# Patient Record
Sex: FEMALE | Race: WHITE | NOT HISPANIC OR LATINO
[De-identification: names, ages, dates, MRNs, and addresses within clinical notes are randomized per-mention and may not be internally consistent; named-entity substitution may affect disease eponyms.]

---

## 2017-03-27 ENCOUNTER — APPOINTMENT (OUTPATIENT)
Dept: OBGYN | Facility: CLINIC | Age: 62
End: 2017-03-27

## 2017-03-27 VITALS
SYSTOLIC BLOOD PRESSURE: 114 MMHG | WEIGHT: 160 LBS | DIASTOLIC BLOOD PRESSURE: 70 MMHG | BODY MASS INDEX: 28.35 KG/M2 | HEIGHT: 63 IN

## 2017-03-27 DIAGNOSIS — N64.4 MASTODYNIA: ICD-10-CM

## 2017-03-28 ENCOUNTER — FORM ENCOUNTER (OUTPATIENT)
Age: 62
End: 2017-03-28

## 2017-03-29 ENCOUNTER — OUTPATIENT (OUTPATIENT)
Dept: OUTPATIENT SERVICES | Facility: HOSPITAL | Age: 62
LOS: 1 days | End: 2017-03-29
Payer: COMMERCIAL

## 2017-03-29 ENCOUNTER — APPOINTMENT (OUTPATIENT)
Dept: MAMMOGRAPHY | Facility: CLINIC | Age: 62
End: 2017-03-29

## 2017-03-29 ENCOUNTER — APPOINTMENT (OUTPATIENT)
Dept: ULTRASOUND IMAGING | Facility: CLINIC | Age: 62
End: 2017-03-29

## 2017-03-29 DIAGNOSIS — Z00.8 ENCOUNTER FOR OTHER GENERAL EXAMINATION: ICD-10-CM

## 2017-03-29 PROCEDURE — 77065 DX MAMMO INCL CAD UNI: CPT

## 2017-03-29 PROCEDURE — 76641 ULTRASOUND BREAST COMPLETE: CPT

## 2017-03-29 PROCEDURE — G0279: CPT

## 2017-03-30 ENCOUNTER — RESULT REVIEW (OUTPATIENT)
Age: 62
End: 2017-03-30

## 2017-04-04 DIAGNOSIS — N60.42 MAMMARY DUCT ECTASIA OF LEFT BREAST: ICD-10-CM

## 2017-04-04 DIAGNOSIS — R92.8 OTHER ABNORMAL AND INCONCLUSIVE FINDINGS ON DIAGNOSTIC IMAGING OF BREAST: ICD-10-CM

## 2017-07-17 ENCOUNTER — APPOINTMENT (OUTPATIENT)
Dept: OBGYN | Facility: CLINIC | Age: 62
End: 2017-07-17

## 2017-07-17 VITALS
SYSTOLIC BLOOD PRESSURE: 122 MMHG | HEIGHT: 63 IN | WEIGHT: 158 LBS | BODY MASS INDEX: 28 KG/M2 | DIASTOLIC BLOOD PRESSURE: 70 MMHG

## 2017-07-17 DIAGNOSIS — N81.10 CYSTOCELE, UNSPECIFIED: ICD-10-CM

## 2017-07-17 DIAGNOSIS — R10.2 PELVIC AND PERINEAL PAIN: ICD-10-CM

## 2017-07-18 LAB
APPEARANCE: CLEAR
BACTERIA: NEGATIVE
BILIRUBIN URINE: NEGATIVE
BLOOD URINE: NEGATIVE
COLOR: YELLOW
GLUCOSE QUALITATIVE U: NORMAL MG/DL
HYALINE CASTS: 2 /LPF
KETONES URINE: NEGATIVE
LEUKOCYTE ESTERASE URINE: NEGATIVE
MICROSCOPIC-UA: NORMAL
NITRITE URINE: NEGATIVE
PH URINE: 5.5
PROTEIN URINE: NEGATIVE MG/DL
RED BLOOD CELLS URINE: 3 /HPF
SPECIFIC GRAVITY URINE: 1.02
SQUAMOUS EPITHELIAL CELLS: 1 /HPF
UROBILINOGEN URINE: NORMAL MG/DL
WHITE BLOOD CELLS URINE: 0 /HPF

## 2017-07-20 ENCOUNTER — CLINICAL ADVICE (OUTPATIENT)
Age: 62
End: 2017-07-20

## 2017-07-20 DIAGNOSIS — R39.9 UNSPECIFIED SYMPTOMS AND SIGNS INVOLVING THE GENITOURINARY SYSTEM: ICD-10-CM

## 2017-07-20 LAB — BACTERIA UR CULT: ABNORMAL

## 2017-07-25 ENCOUNTER — FORM ENCOUNTER (OUTPATIENT)
Age: 62
End: 2017-07-25

## 2017-07-26 ENCOUNTER — OUTPATIENT (OUTPATIENT)
Dept: OUTPATIENT SERVICES | Facility: HOSPITAL | Age: 62
LOS: 1 days | End: 2017-07-26
Payer: COMMERCIAL

## 2017-07-26 ENCOUNTER — APPOINTMENT (OUTPATIENT)
Dept: ULTRASOUND IMAGING | Facility: CLINIC | Age: 62
End: 2017-07-26

## 2017-07-26 DIAGNOSIS — Z00.8 ENCOUNTER FOR OTHER GENERAL EXAMINATION: ICD-10-CM

## 2017-07-26 PROCEDURE — 76856 US EXAM PELVIC COMPLETE: CPT

## 2017-07-26 PROCEDURE — 76830 TRANSVAGINAL US NON-OB: CPT

## 2017-07-28 ENCOUNTER — RESULT REVIEW (OUTPATIENT)
Age: 62
End: 2017-07-28

## 2017-08-09 ENCOUNTER — APPOINTMENT (OUTPATIENT)
Dept: OBGYN | Facility: CLINIC | Age: 62
End: 2017-08-09
Payer: COMMERCIAL

## 2017-08-09 DIAGNOSIS — Z87.440 PERSONAL HISTORY OF URINARY (TRACT) INFECTIONS: ICD-10-CM

## 2017-08-09 DIAGNOSIS — R31.29 OTHER MICROSCOPIC HEMATURIA: ICD-10-CM

## 2017-08-09 PROCEDURE — 99213 OFFICE O/P EST LOW 20 MIN: CPT

## 2017-08-09 PROCEDURE — 81003 URINALYSIS AUTO W/O SCOPE: CPT | Mod: QW

## 2017-08-11 LAB
APPEARANCE: CLEAR
BACTERIA: NEGATIVE
BILIRUBIN URINE: NEGATIVE
BLOOD URINE: NEGATIVE
COLOR: YELLOW
GLUCOSE QUALITATIVE U: NORMAL MG/DL
HYALINE CASTS: 3 /LPF
KETONES URINE: NEGATIVE
LEUKOCYTE ESTERASE URINE: NEGATIVE
MICROSCOPIC-UA: NORMAL
NITRITE URINE: NEGATIVE
PH URINE: 6.5
PROTEIN URINE: NEGATIVE MG/DL
RED BLOOD CELLS URINE: 2 /HPF
SPECIFIC GRAVITY URINE: 1.01
SQUAMOUS EPITHELIAL CELLS: 0 /HPF
UROBILINOGEN URINE: NORMAL MG/DL
WHITE BLOOD CELLS URINE: 0 /HPF

## 2017-08-14 LAB — BACTERIA UR CULT: NORMAL

## 2017-09-25 ENCOUNTER — APPOINTMENT (OUTPATIENT)
Dept: OBGYN | Facility: CLINIC | Age: 62
End: 2017-09-25
Payer: COMMERCIAL

## 2017-09-25 VITALS
HEIGHT: 63 IN | DIASTOLIC BLOOD PRESSURE: 80 MMHG | WEIGHT: 160 LBS | BODY MASS INDEX: 28.35 KG/M2 | SYSTOLIC BLOOD PRESSURE: 115 MMHG

## 2017-09-25 DIAGNOSIS — N95.2 POSTMENOPAUSAL ATROPHIC VAGINITIS: ICD-10-CM

## 2017-09-25 DIAGNOSIS — Z01.419 ENCOUNTER FOR GYNECOLOGICAL EXAMINATION (GENERAL) (ROUTINE) W/OUT ABNORMAL FINDINGS: ICD-10-CM

## 2017-09-25 PROCEDURE — 82270 OCCULT BLOOD FECES: CPT

## 2017-09-25 PROCEDURE — 99396 PREV VISIT EST AGE 40-64: CPT

## 2017-09-25 PROCEDURE — 81003 URINALYSIS AUTO W/O SCOPE: CPT | Mod: QW

## 2017-09-28 LAB
CYTOLOGY CVX/VAG DOC THIN PREP: NORMAL
HPV HIGH+LOW RISK DNA PNL CVX: NEGATIVE

## 2017-10-24 ENCOUNTER — FORM ENCOUNTER (OUTPATIENT)
Age: 62
End: 2017-10-24

## 2017-10-25 ENCOUNTER — APPOINTMENT (OUTPATIENT)
Dept: MAMMOGRAPHY | Facility: CLINIC | Age: 62
End: 2017-10-25
Payer: COMMERCIAL

## 2017-10-25 ENCOUNTER — APPOINTMENT (OUTPATIENT)
Dept: RADIOLOGY | Facility: CLINIC | Age: 62
End: 2017-10-25
Payer: COMMERCIAL

## 2017-10-25 ENCOUNTER — OUTPATIENT (OUTPATIENT)
Dept: OUTPATIENT SERVICES | Facility: HOSPITAL | Age: 62
LOS: 1 days | End: 2017-10-25
Payer: COMMERCIAL

## 2017-10-25 DIAGNOSIS — Z00.8 ENCOUNTER FOR OTHER GENERAL EXAMINATION: ICD-10-CM

## 2017-10-25 PROCEDURE — 77067 SCR MAMMO BI INCL CAD: CPT

## 2017-10-25 PROCEDURE — G0202: CPT | Mod: 26

## 2017-10-25 PROCEDURE — 77063 BREAST TOMOSYNTHESIS BI: CPT | Mod: 26

## 2017-10-25 PROCEDURE — 77080 DXA BONE DENSITY AXIAL: CPT | Mod: 26

## 2017-10-25 PROCEDURE — 77080 DXA BONE DENSITY AXIAL: CPT

## 2017-10-25 PROCEDURE — 77063 BREAST TOMOSYNTHESIS BI: CPT

## 2017-11-02 ENCOUNTER — RESULT REVIEW (OUTPATIENT)
Age: 62
End: 2017-11-02

## 2018-06-07 RX ORDER — CONJUGATED ESTROGENS 0.62 MG/G
0.62 CREAM VAGINAL
Qty: 30 | Refills: 1 | Status: COMPLETED | COMMUNITY
Start: 2017-07-17 | End: 2017-07-17

## 2018-06-07 RX ORDER — CIPROFLOXACIN HYDROCHLORIDE 500 MG/1
500 TABLET, FILM COATED ORAL
Qty: 14 | Refills: 0 | Status: COMPLETED | COMMUNITY
Start: 2017-07-20 | End: 2017-07-20

## 2018-09-26 ENCOUNTER — APPOINTMENT (OUTPATIENT)
Dept: OBGYN | Facility: CLINIC | Age: 63
End: 2018-09-26
Payer: COMMERCIAL

## 2018-09-26 VITALS
HEIGHT: 63 IN | SYSTOLIC BLOOD PRESSURE: 128 MMHG | WEIGHT: 155 LBS | DIASTOLIC BLOOD PRESSURE: 54 MMHG | OXYGEN SATURATION: 98 % | BODY MASS INDEX: 27.46 KG/M2 | TEMPERATURE: 98.7 F

## 2018-09-26 DIAGNOSIS — N95.2 POSTMENOPAUSAL ATROPHIC VAGINITIS: ICD-10-CM

## 2018-09-26 PROCEDURE — 82270 OCCULT BLOOD FECES: CPT

## 2018-09-26 PROCEDURE — 99396 PREV VISIT EST AGE 40-64: CPT

## 2018-10-19 LAB
CYTOLOGY CVX/VAG DOC THIN PREP: NORMAL
HPV HIGH+LOW RISK DNA PNL CVX: NOT DETECTED

## 2018-10-25 ENCOUNTER — FORM ENCOUNTER (OUTPATIENT)
Age: 63
End: 2018-10-25

## 2018-10-26 ENCOUNTER — OUTPATIENT (OUTPATIENT)
Dept: OUTPATIENT SERVICES | Facility: HOSPITAL | Age: 63
LOS: 1 days | End: 2018-10-26
Payer: COMMERCIAL

## 2018-10-26 ENCOUNTER — APPOINTMENT (OUTPATIENT)
Dept: MAMMOGRAPHY | Facility: CLINIC | Age: 63
End: 2018-10-26
Payer: COMMERCIAL

## 2018-10-26 DIAGNOSIS — Z00.8 ENCOUNTER FOR OTHER GENERAL EXAMINATION: ICD-10-CM

## 2018-10-26 PROCEDURE — 77067 SCR MAMMO BI INCL CAD: CPT | Mod: 26

## 2018-10-26 PROCEDURE — 77067 SCR MAMMO BI INCL CAD: CPT

## 2018-10-26 PROCEDURE — 77063 BREAST TOMOSYNTHESIS BI: CPT | Mod: 26

## 2018-10-26 PROCEDURE — 77063 BREAST TOMOSYNTHESIS BI: CPT

## 2019-10-23 ENCOUNTER — APPOINTMENT (OUTPATIENT)
Dept: OBGYN | Facility: CLINIC | Age: 64
End: 2019-10-23
Payer: COMMERCIAL

## 2019-10-23 VITALS
BODY MASS INDEX: 28.7 KG/M2 | WEIGHT: 162 LBS | HEIGHT: 63 IN | SYSTOLIC BLOOD PRESSURE: 100 MMHG | DIASTOLIC BLOOD PRESSURE: 70 MMHG

## 2019-10-23 DIAGNOSIS — Z01.419 ENCOUNTER FOR GYNECOLOGICAL EXAMINATION (GENERAL) (ROUTINE) W/OUT ABNORMAL FINDINGS: ICD-10-CM

## 2019-10-23 DIAGNOSIS — N95.2 POSTMENOPAUSAL ATROPHIC VAGINITIS: ICD-10-CM

## 2019-10-23 DIAGNOSIS — Z80.3 FAMILY HISTORY OF MALIGNANT NEOPLASM OF BREAST: ICD-10-CM

## 2019-10-23 PROCEDURE — 82270 OCCULT BLOOD FECES: CPT

## 2019-10-23 PROCEDURE — 99396 PREV VISIT EST AGE 40-64: CPT

## 2019-10-23 NOTE — CHIEF COMPLAINT
[Annual Visit] : annual visit [FreeTextEntry1] : Patient is a 64-year-old female presents for routine annual gynecologic examination. Patient has a significant family history of breast cancer. Patient's last mammogram was one year ago last bone density 2 years ago. Patient uses Premarin cream. Patient also states that her drop bladder is causing occasional discomfort. Discussed decision in detail the patient.

## 2019-10-23 NOTE — PHYSICAL EXAM
[Awake] : awake [Alert] : alert [Acute Distress] : no acute distress [Mass] : no breast mass [Nipple Discharge] : no nipple discharge [Axillary LAD] : no axillary lymphadenopathy [Soft] : soft [Tender] : non tender [Oriented x3] : oriented to person, place, and time [Vulvar Atrophy] : vulvar atrophy [Normal] : uterus [Atrophy] : atrophy [Cystocele] : a cystocele [No Bleeding] : there was no active vaginal bleeding [Pap Obtained] : a Pap smear was performed [Uterine Adnexae] : were not tender and not enlarged [No Tenderness] : no rectal tenderness [Occult Blood] : occult blood test from digital rectal exam was negative [Nl Sphincter Tone] : normal sphincter tone [FreeTextEntry4] : mild cystocele

## 2019-10-25 LAB — HPV HIGH+LOW RISK DNA PNL CVX: NOT DETECTED

## 2019-10-29 ENCOUNTER — FORM ENCOUNTER (OUTPATIENT)
Age: 64
End: 2019-10-29

## 2019-10-30 ENCOUNTER — OUTPATIENT (OUTPATIENT)
Dept: OUTPATIENT SERVICES | Facility: HOSPITAL | Age: 64
LOS: 1 days | End: 2019-10-30
Payer: COMMERCIAL

## 2019-10-30 ENCOUNTER — APPOINTMENT (OUTPATIENT)
Dept: RADIOLOGY | Facility: CLINIC | Age: 64
End: 2019-10-30
Payer: COMMERCIAL

## 2019-10-30 ENCOUNTER — APPOINTMENT (OUTPATIENT)
Dept: MAMMOGRAPHY | Facility: CLINIC | Age: 64
End: 2019-10-30
Payer: COMMERCIAL

## 2019-10-30 DIAGNOSIS — Z00.8 ENCOUNTER FOR OTHER GENERAL EXAMINATION: ICD-10-CM

## 2019-10-30 PROCEDURE — 77067 SCR MAMMO BI INCL CAD: CPT | Mod: 26

## 2019-10-30 PROCEDURE — 77063 BREAST TOMOSYNTHESIS BI: CPT | Mod: 26

## 2019-10-30 PROCEDURE — 77080 DXA BONE DENSITY AXIAL: CPT | Mod: 26

## 2019-10-30 PROCEDURE — 77063 BREAST TOMOSYNTHESIS BI: CPT

## 2019-10-30 PROCEDURE — 77067 SCR MAMMO BI INCL CAD: CPT

## 2019-10-30 PROCEDURE — 77080 DXA BONE DENSITY AXIAL: CPT

## 2019-11-15 ENCOUNTER — RESULT REVIEW (OUTPATIENT)
Age: 64
End: 2019-11-15

## 2020-05-27 ENCOUNTER — APPOINTMENT (OUTPATIENT)
Dept: OBGYN | Facility: CLINIC | Age: 65
End: 2020-05-27
Payer: COMMERCIAL

## 2020-05-27 VITALS — HEIGHT: 63 IN | DIASTOLIC BLOOD PRESSURE: 62 MMHG | SYSTOLIC BLOOD PRESSURE: 110 MMHG

## 2020-05-27 DIAGNOSIS — N60.12 DIFFUSE CYSTIC MASTOPATHY OF RIGHT BREAST: ICD-10-CM

## 2020-05-27 DIAGNOSIS — Z80.3 FAMILY HISTORY OF MALIGNANT NEOPLASM OF BREAST: ICD-10-CM

## 2020-05-27 DIAGNOSIS — N60.11 DIFFUSE CYSTIC MASTOPATHY OF RIGHT BREAST: ICD-10-CM

## 2020-05-27 PROCEDURE — 99213 OFFICE O/P EST LOW 20 MIN: CPT

## 2020-05-27 NOTE — CHIEF COMPLAINT
[Follow Up] : follow up GYN visit [FreeTextEntry1] : Patient is a 65-year-old female presents with a six-month interval breast exam surveillance. Patient with a significant family history of breast cancer and fibrocystic breast changes. Patient has no complaints. Patient's last mammogram was October 2019

## 2020-09-16 ENCOUNTER — TRANSCRIPTION ENCOUNTER (OUTPATIENT)
Age: 65
End: 2020-09-16

## 2020-10-31 ENCOUNTER — OUTPATIENT (OUTPATIENT)
Dept: OUTPATIENT SERVICES | Facility: HOSPITAL | Age: 65
LOS: 1 days | End: 2020-10-31
Payer: COMMERCIAL

## 2020-10-31 ENCOUNTER — RESULT REVIEW (OUTPATIENT)
Age: 65
End: 2020-10-31

## 2020-10-31 ENCOUNTER — APPOINTMENT (OUTPATIENT)
Dept: MAMMOGRAPHY | Facility: CLINIC | Age: 65
End: 2020-10-31
Payer: COMMERCIAL

## 2020-10-31 DIAGNOSIS — Z00.8 ENCOUNTER FOR OTHER GENERAL EXAMINATION: ICD-10-CM

## 2020-10-31 DIAGNOSIS — Z12.39 ENCOUNTER FOR OTHER SCREENING FOR MALIGNANT NEOPLASM OF BREAST: ICD-10-CM

## 2020-10-31 PROCEDURE — 77067 SCR MAMMO BI INCL CAD: CPT

## 2020-10-31 PROCEDURE — 77063 BREAST TOMOSYNTHESIS BI: CPT

## 2020-10-31 PROCEDURE — 77063 BREAST TOMOSYNTHESIS BI: CPT | Mod: 26

## 2020-10-31 PROCEDURE — 77067 SCR MAMMO BI INCL CAD: CPT | Mod: 26

## 2020-12-01 ENCOUNTER — APPOINTMENT (OUTPATIENT)
Dept: OBGYN | Facility: CLINIC | Age: 65
End: 2020-12-01
Payer: COMMERCIAL

## 2020-12-01 VITALS
RESPIRATION RATE: 16 BRPM | WEIGHT: 15 LBS | BODY MASS INDEX: 2.66 KG/M2 | DIASTOLIC BLOOD PRESSURE: 68 MMHG | HEIGHT: 63 IN | SYSTOLIC BLOOD PRESSURE: 106 MMHG

## 2020-12-01 DIAGNOSIS — Z01.419 ENCOUNTER FOR GYNECOLOGICAL EXAMINATION (GENERAL) (ROUTINE) W/OUT ABNORMAL FINDINGS: ICD-10-CM

## 2020-12-01 DIAGNOSIS — N95.2 POSTMENOPAUSAL ATROPHIC VAGINITIS: ICD-10-CM

## 2020-12-01 DIAGNOSIS — Z80.3 FAMILY HISTORY OF MALIGNANT NEOPLASM OF BREAST: ICD-10-CM

## 2020-12-01 PROCEDURE — 99396 PREV VISIT EST AGE 40-64: CPT

## 2020-12-01 PROCEDURE — 99072 ADDL SUPL MATRL&STAF TM PHE: CPT

## 2020-12-01 NOTE — HISTORY OF PRESENT ILLNESS
[FreeTextEntry1] : Patient is a 65-year-old female presents for routine gynecologic examination. Patient has no complaints. Patient's family history is significant for mother who developed breast cancer in her 80s. Patient uses Premarin cream. Patient's last mammogram was October 2020 last bone density was one year ago.

## 2020-12-02 LAB
CYTOLOGY CVX/VAG DOC THIN PREP: NORMAL
HPV HIGH+LOW RISK DNA PNL CVX: NOT DETECTED

## 2020-12-07 ENCOUNTER — APPOINTMENT (OUTPATIENT)
Dept: OBGYN | Facility: CLINIC | Age: 65
End: 2020-12-07

## 2020-12-15 PROBLEM — Z87.440 HISTORY OF URINARY TRACT INFECTION: Status: RESOLVED | Noted: 2017-07-20 | Resolved: 2020-12-15

## 2021-03-19 ENCOUNTER — NON-APPOINTMENT (OUTPATIENT)
Age: 66
End: 2021-03-19

## 2021-03-29 ENCOUNTER — NON-APPOINTMENT (OUTPATIENT)
Age: 66
End: 2021-03-29

## 2021-03-31 ENCOUNTER — NON-APPOINTMENT (OUTPATIENT)
Age: 66
End: 2021-03-31

## 2021-03-31 RX ORDER — CONJUGATED ESTROGENS 0.62 MG/G
0.62 CREAM VAGINAL
Qty: 1 | Refills: 3 | Status: DISCONTINUED | COMMUNITY
Start: 2019-10-23 | End: 2021-03-31

## 2021-03-31 RX ORDER — CONJUGATED ESTROGENS 0.62 MG/G
0.62 CREAM VAGINAL
Qty: 1 | Refills: 2 | Status: DISCONTINUED | COMMUNITY
Start: 2020-12-01 | End: 2021-03-31

## 2021-03-31 RX ORDER — CONJUGATED ESTROGENS 0.62 MG/G
0.62 CREAM VAGINAL
Qty: 1 | Refills: 3 | Status: DISCONTINUED | COMMUNITY
Start: 2018-09-26 | End: 2021-03-31

## 2021-04-06 ENCOUNTER — NON-APPOINTMENT (OUTPATIENT)
Age: 66
End: 2021-04-06

## 2021-10-25 DIAGNOSIS — Z12.39 ENCOUNTER FOR OTHER SCREENING FOR MALIGNANT NEOPLASM OF BREAST: ICD-10-CM

## 2021-11-16 ENCOUNTER — OUTPATIENT (OUTPATIENT)
Dept: OUTPATIENT SERVICES | Facility: HOSPITAL | Age: 66
LOS: 1 days | End: 2021-11-16
Payer: COMMERCIAL

## 2021-11-16 ENCOUNTER — RESULT REVIEW (OUTPATIENT)
Age: 66
End: 2021-11-16

## 2021-11-16 ENCOUNTER — APPOINTMENT (OUTPATIENT)
Dept: RADIOLOGY | Facility: CLINIC | Age: 66
End: 2021-11-16
Payer: COMMERCIAL

## 2021-11-16 ENCOUNTER — APPOINTMENT (OUTPATIENT)
Dept: MAMMOGRAPHY | Facility: CLINIC | Age: 66
End: 2021-11-16
Payer: COMMERCIAL

## 2021-11-16 DIAGNOSIS — Z12.39 ENCOUNTER FOR OTHER SCREENING FOR MALIGNANT NEOPLASM OF BREAST: ICD-10-CM

## 2021-11-16 PROCEDURE — 77063 BREAST TOMOSYNTHESIS BI: CPT | Mod: 26

## 2021-11-16 PROCEDURE — 77067 SCR MAMMO BI INCL CAD: CPT

## 2021-11-16 PROCEDURE — 77085 DXA BONE DENSITY AXL VRT FX: CPT

## 2021-11-16 PROCEDURE — 77085 DXA BONE DENSITY AXL VRT FX: CPT | Mod: 26

## 2021-11-16 PROCEDURE — 77063 BREAST TOMOSYNTHESIS BI: CPT

## 2021-11-16 PROCEDURE — 77067 SCR MAMMO BI INCL CAD: CPT | Mod: 26

## 2021-11-19 ENCOUNTER — NON-APPOINTMENT (OUTPATIENT)
Age: 66
End: 2021-11-19

## 2021-12-15 ENCOUNTER — APPOINTMENT (OUTPATIENT)
Dept: OBGYN | Facility: CLINIC | Age: 66
End: 2021-12-15
Payer: COMMERCIAL

## 2021-12-15 VITALS
HEIGHT: 63 IN | BODY MASS INDEX: 27.46 KG/M2 | SYSTOLIC BLOOD PRESSURE: 126 MMHG | WEIGHT: 155 LBS | DIASTOLIC BLOOD PRESSURE: 80 MMHG

## 2021-12-15 DIAGNOSIS — Z80.3 FAMILY HISTORY OF MALIGNANT NEOPLASM OF BREAST: ICD-10-CM

## 2021-12-15 DIAGNOSIS — Z01.419 ENCOUNTER FOR GYNECOLOGICAL EXAMINATION (GENERAL) (ROUTINE) W/OUT ABNORMAL FINDINGS: ICD-10-CM

## 2021-12-15 DIAGNOSIS — Z87.39 PERSONAL HISTORY OF OTHER DISEASES OF THE MUSCULOSKELETAL SYSTEM AND CONNECTIVE TISSUE: ICD-10-CM

## 2021-12-15 DIAGNOSIS — N81.11 CYSTOCELE, MIDLINE: ICD-10-CM

## 2021-12-15 DIAGNOSIS — N95.2 POSTMENOPAUSAL ATROPHIC VAGINITIS: ICD-10-CM

## 2021-12-15 PROCEDURE — 82270 OCCULT BLOOD FECES: CPT

## 2021-12-15 PROCEDURE — 99396 PREV VISIT EST AGE 40-64: CPT

## 2021-12-15 NOTE — HISTORY OF PRESENT ILLNESS
[FreeTextEntry1] : Patient is a 66-year-old female presents for routine annual gynecologic examination. Patient has a significant family history mother with breast cancer diagnosed postmenopausal. Patient states that she does have a slightly symptomatic dropped bladder. No other symptoms other than occasional pressure. Discuss this issue the patient in terms of treatment options patient states that she understands and will decide  patient's last mammogram in bone density n 2021

## 2021-12-15 NOTE — PHYSICAL EXAM
[Chaperone Present] : A chaperone was present in the examining room during all aspects of the physical examination [Appropriately responsive] : appropriately responsive [Alert] : alert [No Acute Distress] : no acute distress [No Lymphadenopathy] : no lymphadenopathy [Regular Rate Rhythm] : regular rate rhythm [No Murmurs] : no murmurs [Clear to Auscultation B/L] : clear to auscultation bilaterally [Soft] : soft [Non-tender] : non-tender [Non-distended] : non-distended [No HSM] : No HSM [No Lesions] : no lesions [No Mass] : no mass [Oriented x3] : oriented x3 [Examination Of The Breasts] : a normal appearance [No Masses] : no breast masses were palpable [Vulvar Atrophy] : vulvar atrophy [Labia Majora] : normal [Labia Minora] : normal [Atrophy] : atrophy [Cystocele] : a cystocele [Normal] : normal [Uterine Adnexae] : normal [No Tenderness] : no tenderness [Nl Sphincter Tone] : normal sphincter tone [FreeTextEntry4] : first to second degree cystocele [FreeTextEntry5] : Pap done [FreeTextEntry9] : hemoccult negative

## 2021-12-17 LAB — HPV HIGH+LOW RISK DNA PNL CVX: NOT DETECTED

## 2022-01-03 ENCOUNTER — NON-APPOINTMENT (OUTPATIENT)
Age: 67
End: 2022-01-03

## 2022-09-28 RX ORDER — ESTRADIOL 0.1 MG/G
0.1 CREAM VAGINAL
Qty: 1 | Refills: 0 | Status: ACTIVE | COMMUNITY
Start: 2021-03-31 | End: 1900-01-01

## 2022-09-28 RX ORDER — CONJUGATED ESTROGENS 0.62 MG/G
0.62 CREAM VAGINAL
Qty: 1 | Refills: 0 | Status: ACTIVE | COMMUNITY
Start: 2022-09-28 | End: 1900-01-01

## 2022-11-07 ENCOUNTER — APPOINTMENT (OUTPATIENT)
Dept: ORTHOPEDIC SURGERY | Facility: CLINIC | Age: 67
End: 2022-11-07

## 2022-11-07 VITALS — WEIGHT: 153 LBS | HEIGHT: 63 IN | BODY MASS INDEX: 27.11 KG/M2

## 2022-11-07 PROCEDURE — 99204 OFFICE O/P NEW MOD 45 MIN: CPT

## 2022-11-07 PROCEDURE — 72040 X-RAY EXAM NECK SPINE 2-3 VW: CPT

## 2022-11-08 ENCOUNTER — FORM ENCOUNTER (OUTPATIENT)
Age: 67
End: 2022-11-08

## 2022-11-09 ENCOUNTER — APPOINTMENT (OUTPATIENT)
Dept: MRI IMAGING | Facility: CLINIC | Age: 67
End: 2022-11-09

## 2022-11-09 PROCEDURE — 72141 MRI NECK SPINE W/O DYE: CPT | Mod: MH

## 2022-11-09 NOTE — HISTORY OF PRESENT ILLNESS
[de-identified] : The patient is a 67 year old right hand dominant female who presents today complaining of right shoulder pain.  \par Date of Injury/Onset: 9/7/22\par Pain:    At Rest: 2/10 \par With Activity:  7/10 \par Mechanism of injury: In [employer] and a [employer] box fell on patient and has had right shoulder pain since\par This is NOT a Work Related Injury being treated under Worker's Compensation.\par This is NOt an athletic injury occurring associated with an interscholastic or organized sports team.\par Quality of symptoms: aches, radiates down to elbow, sore\par Improves with: NSAIDs, rest\par Worse with: continuos circular motions, overuse\par Prior treatment: Called GP day it happened and he ordered an Xray and CT of her head\par Prior Imaging: XRay at [facility]\par Out of work/sport: still working\par 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 Penny\par \par

## 2022-11-09 NOTE — IMAGING
[Right] : right shoulder [Glenohumeral arthritis] : Glenohumeral arthritis [de-identified] : The patient is a well appearing 67 year  old female of their stated age. \par Neck is supple & nontender to palpation. + Spurling's test on the right. \par \par Effected Shoulder: RIGHT \par Inspection: \par Scapula Winging: Negative \par Deformity: None \par Erythema: None \par Ecchymosis: None \par Abrasions: None \par Effusion: None \par \par Range of Motion: \par Active Forward Flexion: 180 degrees  \par Active Abduction: 180 degrees  \par Passive Forward Flexion: 180 degrees  \par Passive Abduction: 180 degrees  \par ER @ 90 degrees: 90 degrees \par IR @ 90 degrees: 45 degrees \par ER @ 0 degrees: 50 degrees \par Motor Exam: \par Forward Flexion: 5 out of 5 \par Flexion Plane of Scapula: 5 out of 5 \par Abduction: 5 out of 5 \par Internal Rotation: 5 out of 5 \par External Rotation: 5 out of 5 \par Distal Motor Strength: 5 out of 5 \par \par Stability Testing: \par Anterior: 1+ \par Posterior: 1+ \par Sulcus N: 1+ \par Sulcus ER: 1+ \par Provocative Tests: \par Drop Arm: Negative \par Impingement: +\par [place]: Negative \par X-Arm Adduction: Negative \par Belly Press: Negative \par Bear Hug: Negative \par Lift Off: Negative \par Apprehension: Negative \par Relocation: Negative \par Posterior Load & Shift: Negative \par \par Palpation: \par AC Joint: Nontender \par Clavicle: Nontender \par SC Joint: Nontender \par Bicepital Groove: Nontender \par Coracoid Process: Nontender \par Pectoralis Minor Tendon: Nontender \par Pectoralis Major Tendon: Nontender & palpably intact \par Latissimus Dorsi: Nontender  \par Proximal Humerus: Nontender \par Scapula Body: Nontender \par Medial Scapula Boarder: Nontender \par Scapula Spine: Nontender \par \par Neurologic Exam: Sensation to Light Touch: \par Axillary: Grossly intact \par Ulnar: Grossly intact \par Radial: Grossly intact \par Median: Grossly intact \par Other:  N/A \par Circulatory/Pulses: \par Ulnar: 2+ \par Radial: 2+ \par Other Pertinent Findings: None \par \par Contralateral Shoulder \par Range of Motion: \par Active Forward Flexion: 180 degrees  \par Active Abduction: 180 degrees  \par Passive Forward Flexion: 180 degrees  \par Passive Abduction: 180 degrees  \par ER @ 90 degrees: 90 degrees \par IR @ 90 degrees: 45 degrees \par ER @ 0 degrees: 50 degrees \par Motor Exam: \par Forward Flexion: 5 out of 5 \par Flexion Plane of Scapula: 5 out of 5 \par Abduction: 5 out of 5 \par Internal Rotation: 5 out of 5 \par External Rotation: 5 out of 5 \par Distal Motor Strength: 5 out of 5 \par Stability Testing: \par Anterior: 1+ \par Posterior: 1+ \par Sulcus N: 1+ \par Sulcus ER: 1+ \par Other Pertinent Findings: None \par \par Assessment: The patient is a 67 year old female with right shoulder pain and radiographic and physical exam findings consistent with cervical radiculopathy   \par The patient’s condition is acute \par Documents/Results Reviewed Today: Outside X Ray right shoulder and c-spine \par Tests/Studies Independently Interpreted Today: 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 Modify activity as discussed. \par Tests Ordered: MRI c-spine \par Prescription Medications Ordered: None\par Braces/DME Ordered: None \par Activity/Work/Sports Status: None \par Additional Instructions: None\par Follow-Up: after MRI\par \par The patient's current medication management of their orthopedic diagnosis was reviewed today:\par (1) We discussed a comprehensive treatment plan that included possible pharmaceutical management involving the use of prescription strength medications including but not limited to options such as oral Naprosyn 500mg BID, once daily Meloxicam 15 mg, or 500-650 mg Tylenol versus over the counter oral medications and topical prescription NSAID Pennsaid vs over the counter Voltaren gel.\par (2) There is a moderate risk of morbidity with further treatment, especially from use of prescription strength medications and possible side effects of these medications which include upset stomach with oral medications, skin reactions to topical medications and cardiac/renal issues with long term use.\par (3) I recommended that the patient follow-up with their medical physician to discuss any significant specific potential issues with long term medication use such as interactions with current medications or with exacerbation of underlying medical comorbidities.\par (4) The benefits and risks associated with use of injectable, oral or topical, prescription and over the counter anti-inflammatory medications were discussed with the patient. The patient voiced understanding of the risks including but not limited to bleeding, stroke, kidney dysfunction, heart disease, and were referred to the black box warning label for further information.\par \par I, Kimberlee Rodriguez, attest that this documentation has been prepared under the direction and in the presence of Provider Dr. 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

## 2022-11-14 ENCOUNTER — APPOINTMENT (OUTPATIENT)
Dept: ORTHOPEDIC SURGERY | Facility: CLINIC | Age: 67
End: 2022-11-14

## 2022-11-14 VITALS — HEIGHT: 63 IN | BODY MASS INDEX: 27.11 KG/M2 | WEIGHT: 153 LBS

## 2022-11-14 PROCEDURE — 99214 OFFICE O/P EST MOD 30 MIN: CPT

## 2022-11-14 RX ORDER — METHYLPREDNISOLONE 4 MG/1
4 TABLET ORAL
Qty: 1 | Refills: 0 | Status: ACTIVE | COMMUNITY
Start: 2022-11-14 | End: 1900-01-01

## 2022-11-14 NOTE — HISTORY OF PRESENT ILLNESS
[de-identified] : The patient is a 67 year old right hand dominant female who presents today complaining of right shoulder pain.  \par Date of Injury/Onset: 9/7/22\par Pain:  At Rest: 2/10 \par With Activity:  7/10 \par Mechanism of injury: In [employer] and a box fell on patient and has had right shoulder pain since\par This is NOT a Work Related Injury being treated under Worker's Compensation.\par This is NOt an athletic injury occurring associated with an interscholastic or organized sports team.\par Quality of symptoms: aches, radiates down to elbow, sore\par Improves with: NSAIDs, rest\par Worse with: continuous circular motions, overuse\par Prior treatment: Called GP when it happened and he ordered an Xray and CT of her head\par Prior Imaging: MRI cervical spine [facility]\par Out of work/sport: Still working\par 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 Penny\par \par

## 2022-11-14 NOTE — IMAGING
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 \par Neck is supple & nontender to palpation. + Spurling's test on the right. \par \par Effected Shoulder: RIGHT \par Inspection: \par Scapula Winging: Negative \par Deformity: None \par Erythema: None \par Ecchymosis: None \par Abrasions: None \par Effusion: None \par \par Range of Motion: \par Active Forward Flexion: 180 degrees  \par Active Abduction: 180 degrees  \par Passive Forward Flexion: 180 degrees  \par Passive Abduction: 180 degrees  \par ER @ 90 degrees: 90 degrees \par IR @ 90 degrees: 45 degrees \par ER @ 0 degrees: 50 degrees \par Motor Exam: \par Forward Flexion: 5 out of 5 \par Flexion Plane of Scapula: 5 out of 5 \par Abduction: 5 out of 5 \par Internal Rotation: 5 out of 5 \par External Rotation: 5 out of 5 \par Distal Motor Strength: 5 out of 5 \par \par Stability Testing: \par Anterior: 1+ \par Posterior: 1+ \par Sulcus N: 1+ \par Sulcus ER: 1+ \par Provocative Tests: \par Drop Arm: Negative \par Impingement: +\par [place]: Negative \par X-Arm Adduction: Negative \par Belly Press: Negative \par Bear Hug: Negative \par Lift Off: Negative \par Apprehension: Negative \par Relocation: Negative \par Posterior Load & Shift: Negative \par \par Palpation: \par AC Joint: Nontender \par Clavicle: Nontender \par SC Joint: Nontender \par Bicepital Groove: Nontender \par Coracoid Process: Nontender \par Pectoralis Minor Tendon: Nontender \par Pectoralis Major Tendon: Nontender & palpably intact \par Latissimus Dorsi: Nontender  \par Proximal Humerus: Nontender \par Scapula Body: Nontender \par Medial Scapula Boarder: Nontender \par Scapula Spine: Nontender \par \par Neurologic Exam: Sensation to Light Touch: \par Axillary: Grossly intact \par Ulnar: Grossly intact \par Radial: Grossly intact \par Median: Grossly intact \par Other:  N/A \par Circulatory/Pulses: \par Ulnar: 2+ \par Radial: 2+ \par Other Pertinent Findings: None \par \par Contralateral Shoulder \par Range of Motion: \par Active Forward Flexion: 180 degrees  \par Active Abduction: 180 degrees  \par Passive Forward Flexion: 180 degrees  \par Passive Abduction: 180 degrees  \par ER @ 90 degrees: 90 degrees \par IR @ 90 degrees: 45 degrees \par ER @ 0 degrees: 50 degrees \par Motor Exam: \par Forward Flexion: 5 out of 5 \par Flexion Plane of Scapula: 5 out of 5 \par Abduction: 5 out of 5 \par Internal Rotation: 5 out of 5 \par External Rotation: 5 out of 5 \par Distal Motor Strength: 5 out of 5 \par Stability Testing: \par Anterior: 1+ \par Posterior: 1+ \par Sulcus N: 1+ \par Sulcus ER: 1+ \par Other Pertinent Findings: None \par \par Assessment: The patient is a 67 year old female with right shoulder pain and radiographic and physical exam findings consistent with cervical radiculopathy   \par The patient’s condition is acute \par Documents/Results Reviewed Today: MRI cervical spine\par Tests/Studies Independently Interpreted Today: MRI cervical spine reveals multilevel disc degeneration C4-C5, C5-C6, and C6-C7 with right foraminal disc herniation C5-C6\par Pertinent findings include: +Spurling right, +impingement \par Confounding medical conditions/concerns: None\par \par Plan: Patient will start physical therapy, HEP, and stretching. Prescribed Medrol Dose Pack for pain management - use as directed. Referred patient to pain management for further evaluation and possible SHAE. Modify activity as discussed.  \par Tests Ordered: None \par Prescription Medications Ordered: None\par Braces/DME Ordered: None \par Activity/Work/Sports Status: None \par Additional Instructions: None\par Follow-Up: PRN \par \par The patient's current medication management of their orthopedic diagnosis was reviewed today:\par (1) We discussed a comprehensive treatment plan that included possible pharmaceutical management involving the use of prescription strength medications including but not limited to options such as oral Naprosyn 500mg BID, once daily Meloxicam 15 mg, or 500-650 mg Tylenol versus over the counter oral medications and topical prescription NSAID Pennsaid vs over the counter Voltaren gel.\par (2) There is a moderate risk of morbidity with further treatment, especially from use of prescription strength medications and possible side effects of these medications which include upset stomach with oral medications, skin reactions to topical medications and cardiac/renal issues with long term use.\par (3) I recommended that the patient follow-up with their medical physician to discuss any significant specific potential issues with long term medication use such as interactions with current medications or with exacerbation of underlying medical comorbidities.\par (4) The benefits and risks associated with use of injectable, oral or topical, prescription and over the counter anti-inflammatory medications were discussed with the patient. The patient voiced understanding of the risks including but not limited to bleeding, stroke, kidney dysfunction, heart disease, and were referred to the black box warning label for further information.\par \par I, Kimberlee Rodriguez, attest that this documentation has been prepared under the direction and in the presence of Provider Dr. Odell Rayo.\par \par The documentation recorded by the scribe accurately reflects the service I personally performed and the decisions made by me.\par

## 2022-11-14 NOTE — DATA REVIEWED
[MRI] : MRI [Cervical Spine] : cervical spine [Report was reviewed and noted in the chart] : The report was reviewed and noted in the chart [I independently reviewed and interpreted images and report] : I independently reviewed and interpreted images and report [I reviewed the films/CD and additionally noted] : I reviewed the films/CD and additionally noted [FreeTextEntry1] :  multilevel disc degeneration C4-C5, C5-C6, and C6-C7 with right foraminal disc herniation C5-C6

## 2022-11-18 ENCOUNTER — OUTPATIENT (OUTPATIENT)
Dept: OUTPATIENT SERVICES | Facility: HOSPITAL | Age: 67
LOS: 1 days | End: 2022-11-18
Payer: MEDICARE

## 2022-11-18 ENCOUNTER — APPOINTMENT (OUTPATIENT)
Dept: MAMMOGRAPHY | Facility: CLINIC | Age: 67
End: 2022-11-18

## 2022-11-18 ENCOUNTER — RESULT REVIEW (OUTPATIENT)
Age: 67
End: 2022-11-18

## 2022-11-18 DIAGNOSIS — N63.0 UNSPECIFIED LUMP IN UNSPECIFIED BREAST: ICD-10-CM

## 2022-11-18 DIAGNOSIS — Z12.39 ENCOUNTER FOR OTHER SCREENING FOR MALIGNANT NEOPLASM OF BREAST: ICD-10-CM

## 2022-11-18 PROCEDURE — 77067 SCR MAMMO BI INCL CAD: CPT | Mod: 26

## 2022-11-18 PROCEDURE — 77063 BREAST TOMOSYNTHESIS BI: CPT | Mod: 26

## 2022-11-18 PROCEDURE — 77063 BREAST TOMOSYNTHESIS BI: CPT

## 2022-11-18 PROCEDURE — 77067 SCR MAMMO BI INCL CAD: CPT

## 2022-11-28 ENCOUNTER — NON-APPOINTMENT (OUTPATIENT)
Age: 67
End: 2022-11-28

## 2022-11-29 ENCOUNTER — APPOINTMENT (OUTPATIENT)
Dept: ULTRASOUND IMAGING | Facility: CLINIC | Age: 67
End: 2022-11-29

## 2022-11-29 ENCOUNTER — RESULT REVIEW (OUTPATIENT)
Age: 67
End: 2022-11-29

## 2022-11-29 ENCOUNTER — OUTPATIENT (OUTPATIENT)
Dept: OUTPATIENT SERVICES | Facility: HOSPITAL | Age: 67
LOS: 1 days | End: 2022-11-29
Payer: MEDICARE

## 2022-11-29 DIAGNOSIS — N63.0 UNSPECIFIED LUMP IN UNSPECIFIED BREAST: ICD-10-CM

## 2022-11-29 PROCEDURE — 76642 ULTRASOUND BREAST LIMITED: CPT | Mod: 26,LT

## 2022-11-29 PROCEDURE — 76642 ULTRASOUND BREAST LIMITED: CPT

## 2022-12-01 ENCOUNTER — NON-APPOINTMENT (OUTPATIENT)
Age: 67
End: 2022-12-01

## 2022-12-16 ENCOUNTER — APPOINTMENT (OUTPATIENT)
Dept: OBGYN | Facility: CLINIC | Age: 67
End: 2022-12-16

## 2022-12-16 VITALS
HEIGHT: 63 IN | BODY MASS INDEX: 27.64 KG/M2 | DIASTOLIC BLOOD PRESSURE: 80 MMHG | WEIGHT: 156 LBS | SYSTOLIC BLOOD PRESSURE: 132 MMHG

## 2022-12-16 DIAGNOSIS — Z87.39 PERSONAL HISTORY OF OTHER DISEASES OF THE MUSCULOSKELETAL SYSTEM AND CONNECTIVE TISSUE: ICD-10-CM

## 2022-12-16 DIAGNOSIS — N95.2 POSTMENOPAUSAL ATROPHIC VAGINITIS: ICD-10-CM

## 2022-12-16 DIAGNOSIS — M85.80 OTHER SPECIFIED DISORDERS OF BONE DENSITY AND STRUCTURE, UNSPECIFIED SITE: ICD-10-CM

## 2022-12-16 PROCEDURE — G0101: CPT

## 2022-12-16 PROCEDURE — 82270 OCCULT BLOOD FECES: CPT

## 2022-12-16 RX ORDER — ESTRADIOL 0.1 MG/G
0.1 CREAM VAGINAL
Qty: 1 | Refills: 3 | Status: ACTIVE | COMMUNITY
Start: 2022-12-16 | End: 1900-01-01

## 2022-12-16 NOTE — HISTORY OF PRESENT ILLNESS
[FreeTextEntry1] : Patient is a 67-year-old female who presents for a gynecologic evaluation.  Patient has no complaints.  Patient uses vaginal estradiol cream.  Patient's last mammogram was November 2022 and last bone density was November 2021.

## 2022-12-20 LAB — CYTOLOGY CVX/VAG DOC THIN PREP: NORMAL

## 2023-03-06 ENCOUNTER — APPOINTMENT (OUTPATIENT)
Dept: ORTHOPEDIC SURGERY | Facility: CLINIC | Age: 68
End: 2023-03-06
Payer: MEDICARE

## 2023-03-06 VITALS — WEIGHT: 156 LBS | BODY MASS INDEX: 27.64 KG/M2 | HEIGHT: 63 IN

## 2023-03-06 PROCEDURE — 99213 OFFICE O/P EST LOW 20 MIN: CPT

## 2023-03-07 NOTE — IMAGING
[de-identified] : The patient is a well appearing 68 year old female of their stated age. \par Neck is supple & nontender to palpation. + Spurling's test on the right. \par \par Effected Shoulder: RIGHT \par Inspection: \par Scapula Winging: Negative \par Deformity: None \par Erythema: None \par Ecchymosis: None \par Abrasions: None \par Effusion: None \par \par Range of Motion: \par Active Forward Flexion: 180 degrees  \par Active Abduction: 180 degrees  \par Passive Forward Flexion: 180 degrees  \par Passive Abduction: 180 degrees  \par ER @ 90 degrees: 90 degrees \par IR @ 90 degrees: 45 degrees \par ER @ 0 degrees: 50 degrees \par Motor Exam: \par Forward Flexion: 5 out of 5 \par Flexion Plane of Scapula: 5 out of 5 \par Abduction: 5 out of 5 \par Internal Rotation: 5 out of 5 \par External Rotation: 5 out of 5 \par Distal Motor Strength: 5 out of 5 \par \par Stability Testing: \par Anterior: 1+ \par Posterior: 1+ \par Sulcus N: 1+ \par Sulcus ER: 1+ \par Provocative Tests: \par Drop Arm: Negative \par Impingement: +\par [place]: Negative \par X-Arm Adduction: Negative \par Belly Press: Negative \par Bear Hug: Negative \par Lift Off: Negative \par Apprehension: Negative \par Relocation: Negative \par Posterior Load & Shift: Negative \par \par Palpation: \par AC Joint: Nontender \par Clavicle: Nontender \par SC Joint: Nontender \par Bicepital Groove: Nontender \par Coracoid Process: Nontender \par Pectoralis Minor Tendon: Nontender \par Pectoralis Major Tendon: Nontender & palpably intact \par Latissimus Dorsi: Nontender  \par Proximal Humerus: Nontender \par Scapula Body: Nontender \par Medial Scapula Boarder: Nontender \par Scapula Spine: Nontender \par \par Neurologic Exam: Sensation to Light Touch: \par Axillary: Grossly intact \par Ulnar: Grossly intact \par Radial: Grossly intact \par Median: Grossly intact \par Other:  N/A \par Circulatory/Pulses: \par Ulnar: 2+ \par Radial: 2+ \par Other Pertinent Findings: None \par \par Contralateral Shoulder \par Range of Motion: \par Active Forward Flexion: 180 degrees  \par Active Abduction: 180 degrees  \par Passive Forward Flexion: 180 degrees  \par Passive Abduction: 180 degrees  \par ER @ 90 degrees: 90 degrees \par IR @ 90 degrees: 45 degrees \par ER @ 0 degrees: 50 degrees \par Motor Exam: \par Forward Flexion: 5 out of 5 \par Flexion Plane of Scapula: 5 out of 5 \par Abduction: 5 out of 5 \par Internal Rotation: 5 out of 5 \par External Rotation: 5 out of 5 \par Distal Motor Strength: 5 out of 5 \par Stability Testing: \par Anterior: 1+ \par Posterior: 1+ \par Sulcus N: 1+ \par Sulcus ER: 1+ \par Other Pertinent Findings: None \par \par Assessment: The patient is a 68 year old female with right shoulder pain and radiographic and physical exam findings consistent with cervical radiculopathy   \par The patient’s condition is acute \par Documents/Results Reviewed Today: None \par Tests/Studies Independently Interpreted Today: None \par Pertinent findings include: +Spurling right, +impingement \par Confounding medical conditions/concerns: None\par \par Plan: Patient will continue with physical therapy, HEP, and stretching. Patient did not follow up with pain management for cervical radiculopathy evaluation as she declines epidural injection therapy at this time. The patient will continue with formal therapy as her primary treatment modality at this time. Modify activity as discussed.  \par Tests Ordered: None \par Prescription Medications Ordered: None\par Braces/DME Ordered: Discussed appropriate use of OTC anti-inflammatories and analgesics (including but not limited to Aleve, Advil, Tylenol, Motrin, Ibuprofen, Voltaren gel, etc.) \par Activity/Work/Sports Status: None \par Additional Instructions: None\par Follow-Up: 6 weeks \par \par The patient's current medication management of their orthopedic diagnosis was reviewed today:\par (1) We discussed a comprehensive treatment plan that included possible pharmaceutical management involving the use of prescription strength medications including but not limited to options such as oral Naprosyn 500mg BID, once daily Meloxicam 15 mg, or 500-650 mg Tylenol versus over the counter oral medications and topical prescription NSAID Pennsaid vs over the counter Voltaren gel.  Based on our extensive discussion, the patient declined prescription medication and will use over the counter Advil, Alleve, Voltaren Gel or Tylenol as directed.\par (2) There is a moderate risk of morbidity with further treatment, especially from use of prescription strength medications and possible side effects of these medications which include upset stomach with oral medications, skin reactions to topical medications and cardiac/renal issues with long term use.\par (3) I recommended that the patient follow-up with their medical physician to discuss any significant specific potential issues with long term medication use such as interactions with current medications or with exacerbation of underlying medical comorbidities.\par (4) The benefits and risks associated with use of injectable, oral or topical, prescription and over the counter anti-inflammatory medications were discussed with the patient. The patient voiced understanding of the risks including but not limited to bleeding, stroke, kidney dysfunction, heart disease, and were referred to the black box warning label for further information. \par \par Kavya PONCE attest that this documentation has been prepared under the direction and in the presence of Provider Dr. Odell Rayo. \par \par The documentation recorded by the scribe accurately reflects the service KRIS Dowd PA-C personally performed and the decisions made by me.\par The patient was seen by me under the direct supervision of Dr. Odell Rayo\par

## 2023-03-07 NOTE — HISTORY OF PRESENT ILLNESS
[de-identified] : The patient is a 67 year old right hand dominant female who presents today complaining of right shoulder pain.  \par Date of Injury/Onset: 9/7/22\par Pain:  At Rest: 3/10 \par With Activity:  7/10 \par Mechanism of injury: In [employer] and a box fell on patient and has had right shoulder pain since\par This is NOT a Work Related Injury being treated under Worker's Compensation.\par This is NOt an athletic injury occurring associated with an interscholastic or organized sports team.\par Quality of symptoms: aches, radiates down to elbow, sore\par Improves with: NSAIDs, rest\par Worse with: continuous circular motions, overuse\par Treatment/Imaging/Studies Since Last Visit: Started PT 1/19/23\par 	Reports Available For Review Today: none\par Out of work/sport: Still working\par 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 Penny\par \par

## 2023-04-17 ENCOUNTER — APPOINTMENT (OUTPATIENT)
Dept: ORTHOPEDIC SURGERY | Facility: CLINIC | Age: 68
End: 2023-04-17
Payer: MEDICARE

## 2023-04-17 VITALS — HEIGHT: 63 IN | WEIGHT: 156 LBS | BODY MASS INDEX: 27.64 KG/M2

## 2023-04-17 PROCEDURE — 99214 OFFICE O/P EST MOD 30 MIN: CPT

## 2023-04-18 NOTE — IMAGING
[de-identified] : The patient is a well appearing 68 year old female of their stated age. \par Neck is supple & nontender to palpation. + Spurling's test on the right. \par \par Effected Shoulder: RIGHT \par Inspection: \par Scapula Winging: Negative \par Deformity: None \par Erythema: None \par Ecchymosis: None \par Abrasions: None \par Effusion: None \par \par Range of Motion: \par Active Forward Flexion: 180 degrees  \par Active Abduction: 180 degrees  \par Passive Forward Flexion: 180 degrees  \par Passive Abduction: 180 degrees  \par ER @ 90 degrees: 90 degrees \par IR @ 90 degrees: 45 degrees \par ER @ 0 degrees: 50 degrees \par Motor Exam: \par Forward Flexion: 4- out of 5 \par Flexion Plane of Scapula: 4- out of 5 \par Abduction: 4- out of 5 \par Internal Rotation: 5 out of 5 \par External Rotation: 4- out of 5 \par Distal Motor Strength: 5 out of 5 \par \par Stability Testing: \par Anterior: 1+ \par Posterior: 1+ \par Sulcus N: 1+ \par Sulcus ER: 1+ \par Provocative Tests: \par Drop Arm: Negative \par Impingement: +\par [place]: Negative \par X-Arm Adduction: Negative \par Belly Press: Negative \par Bear Hug: Negative \par Lift Off: Negative \par Apprehension: Negative \par Relocation: Negative \par Posterior Load & Shift: Negative \par \par Palpation: \par AC Joint: Nontender \par Clavicle: Nontender \par SC Joint: Nontender \par Bicepital Groove: Nontender \par Coracoid Process: Nontender \par Pectoralis Minor Tendon: Nontender \par Pectoralis Major Tendon: Nontender & palpably intact \par Latissimus Dorsi: Nontender  \par Proximal Humerus: Nontender \par Scapula Body: Nontender \par Medial Scapula Boarder: Nontender \par Scapula Spine: Nontender \par \par Neurologic Exam: Sensation to Light Touch: \par Axillary: Grossly intact \par Ulnar: Grossly intact \par Radial: Grossly intact \par Median: Grossly intact \par Other:  N/A \par Circulatory/Pulses: \par Ulnar: 2+ \par Radial: 2+ \par Other Pertinent Findings: None \par \par Contralateral Shoulder \par Range of Motion: \par Active Forward Flexion: 180 degrees  \par Active Abduction: 180 degrees  \par Passive Forward Flexion: 180 degrees  \par Passive Abduction: 180 degrees  \par ER @ 90 degrees: 90 degrees \par IR @ 90 degrees: 45 degrees \par ER @ 0 degrees: 50 degrees \par Motor Exam: \par Forward Flexion: 5 out of 5 \par Flexion Plane of Scapula: 5 out of 5 \par Abduction: 5 out of 5 \par Internal Rotation: 5 out of 5 \par External Rotation: 5 out of 5 \par Distal Motor Strength: 5 out of 5 \par Stability Testing: \par Anterior: 1+ \par Posterior: 1+ \par Sulcus N: 1+ \par Sulcus ER: 1+ \par Other Pertinent Findings: None \par \par Assessment: The patient is a 68 year old female with right shoulder pain and radiographic and physical exam findings consistent with cervical radiculopathy and possible rotator cuff tear.\par The patient’s condition is acute \par Documents/Results Reviewed Today: None \par Tests/Studies Independently Interpreted Today: None \par Pertinent findings include: +Spurling right, +impingement, 4-/5 ABD, FF, FSP & ER\par Confounding medical conditions/concerns: None\par \par Plan: Due to worsening pain and instability with mechanical symptoms, patient will obtain MRI right shoulder to evaluate for possible rotator cuff tear. Patient has proven refractory to all previous conservative treatments including but not limited to physical therapy, home exercises, activity modifications, rest, ice, antiinflammatories etc. The patient reports improvement in cervical symptoms.  Modify activity as discussed.  \par Tests Ordered: MRI right shoulder \par Prescription Medications Ordered: None\par Braces/DME Ordered: Discussed appropriate use of OTC anti-inflammatories and analgesics (including but not limited to Aleve, Advil, Tylenol, Motrin, Ibuprofen, Voltaren gel, etc.) \par Activity/Work/Sports Status: None \par Additional Instructions: None\par Follow-Up: After MRI \par \par The patient's current medication management of their orthopedic diagnosis was reviewed today:\par (1) We discussed a comprehensive treatment plan that included possible pharmaceutical management involving the use of prescription strength medications including but not limited to options such as oral Naprosyn 500mg BID, once daily Meloxicam 15 mg, or 500-650 mg Tylenol versus over the counter oral medications and topical prescription NSAID Pennsaid vs over the counter Voltaren gel.  Based on our extensive discussion, the patient declined prescription medication and will use over the counter Advil, Alleve, Voltaren Gel or Tylenol as directed.\par (2) There is a moderate risk of morbidity with further treatment, especially from use of prescription strength medications and possible side effects of these medications which include upset stomach with oral medications, skin reactions to topical medications and cardiac/renal issues with long term use.\par (3) I recommended that the patient follow-up with their medical physician to discuss any significant specific potential issues with long term medication use such as interactions with current medications or with exacerbation of underlying medical comorbidities.\par (4) The benefits and risks associated with use of injectable, oral or topical, prescription and over the counter anti-inflammatory medications were discussed with the patient. The patient voiced understanding of the risks including but not limited to bleeding, stroke, kidney dysfunction, heart disease, and were referred to the black box warning label for further information. \par \par Kavya PONCE attest that this documentation has been prepared under the direction and in the presence of Provider Dr. Odell Rayo. \par \par The documentation recorded by the scribe accurately reflects the services I, Dr. Odell Rayo, personally performed and the decisions made by me.\par

## 2023-04-18 NOTE — HISTORY OF PRESENT ILLNESS
[de-identified] : The patient is a 67 year old right hand dominant female who presents today complaining of right shoulder pain.  \par Date of Injury/Onset: 9/7/22\par Pain:  At Rest: 4/10 \par With Activity:  9/10 \par Mechanism of injury: In [employer] and a box fell on patient and has had right shoulder pain since\par This is NOT a Work Related Injury being treated under Worker's Compensation.\par This is NOt an athletic injury occurring associated with an interscholastic or organized sports team.\par Quality of symptoms: pain in shoulder, stiffness in neck\par Improves with: NSAIDs, rest, PT\par Worse with: continuous circular motions, overuse\par Treatment/Imaging/Studies Since Last Visit: PT\par 	Reports Available For Review Today: none\par Out of work/sport: Still working\par 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 Penny\par \par

## 2023-05-11 ENCOUNTER — RESULT REVIEW (OUTPATIENT)
Age: 68
End: 2023-05-11

## 2023-05-22 ENCOUNTER — APPOINTMENT (OUTPATIENT)
Dept: ORTHOPEDIC SURGERY | Facility: CLINIC | Age: 68
End: 2023-05-22
Payer: MEDICARE

## 2023-05-22 VITALS — HEIGHT: 63 IN | BODY MASS INDEX: 27.64 KG/M2 | WEIGHT: 156 LBS

## 2023-05-22 PROCEDURE — 99214 OFFICE O/P EST MOD 30 MIN: CPT

## 2023-05-23 NOTE — IMAGING
[de-identified] : The patient is a well appearing 68 year old female of their stated age. \par Neck is supple & nontender to palpation. + Spurling's test on the right. \par \par Effected Shoulder: RIGHT \par Inspection: \par Scapula Winging: Negative \par Deformity: None \par Erythema: None \par Ecchymosis: None \par Abrasions: None \par Effusion: None \par \par Range of Motion: \par Active Forward Flexion: 180 degrees  \par Active Abduction: 180 degrees  \par Passive Forward Flexion: 180 degrees  \par Passive Abduction: 180 degrees  \par ER @ 90 degrees: 90 degrees \par IR @ 90 degrees: 45 degrees \par ER @ 0 degrees: 50 degrees \par Motor Exam: \par Forward Flexion: 4- out of 5 \par Flexion Plane of Scapula: 4- out of 5 \par Abduction: 4- out of 5 \par Internal Rotation: 5 out of 5 \par External Rotation: 4- out of 5 \par Distal Motor Strength: 5 out of 5 \par \par Stability Testing: \par Anterior: 1+ \par Posterior: 1+ \par Sulcus N: 1+ \par Sulcus ER: 1+ \par Provocative Tests: \par Drop Arm: Negative \par Impingement: +\par [place]: Negative \par X-Arm Adduction: Negative \par Belly Press: Negative \par Bear Hug: Negative \par Lift Off: Negative \par Apprehension: Negative \par Relocation: Negative \par Posterior Load & Shift: Negative \par \par Palpation: \par AC Joint: Nontender \par Clavicle: Nontender \par SC Joint: Nontender \par Bicepital Groove: Nontender \par Coracoid Process: Nontender \par Pectoralis Minor Tendon: Nontender \par Pectoralis Major Tendon: Nontender & palpably intact \par Latissimus Dorsi: Nontender  \par Proximal Humerus: Nontender \par Scapula Body: Nontender \par Medial Scapula Boarder: Nontender \par Scapula Spine: Nontender \par \par Neurologic Exam: Sensation to Light Touch: \par Axillary: Grossly intact \par Ulnar: Grossly intact \par Radial: Grossly intact \par Median: Grossly intact \par Other:  N/A \par Circulatory/Pulses: \par Ulnar: 2+ \par Radial: 2+ \par Other Pertinent Findings: None \par \par Contralateral Shoulder \par Range of Motion: \par Active Forward Flexion: 180 degrees  \par Active Abduction: 180 degrees  \par Passive Forward Flexion: 180 degrees  \par Passive Abduction: 180 degrees  \par ER @ 90 degrees: 90 degrees \par IR @ 90 degrees: 45 degrees \par ER @ 0 degrees: 50 degrees \par Motor Exam: \par Forward Flexion: 5 out of 5 \par Flexion Plane of Scapula: 5 out of 5 \par Abduction: 5 out of 5 \par Internal Rotation: 5 out of 5 \par External Rotation: 5 out of 5 \par Distal Motor Strength: 5 out of 5 \par Stability Testing: \par Anterior: 1+ \par Posterior: 1+ \par Sulcus N: 1+ \par Sulcus ER: 1+ \par Other Pertinent Findings: None \par \par Assessment: The patient is a 68 year old female with right shoulder pain and radiographic and physical exam findings consistent with cervical radiculopathy and possible rotator cuff tear.\par The patient’s condition is acute \par Documents/Results Reviewed Today: MRI right shoulder\par Tests/Studies Independently Interpreted Today: 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 Patient declines surgical intervention at this time. Further discussed conservative treatments in the form of injections. Patient also declines to receive any injection at this time. Patient will start physical therapy, HEP, and stretching for the shoulder and neck. Prescribed Naprosyn for pain management - use as directed. Modify activity as discussed. \par Tests Ordered: None \par Prescription Medications Ordered: None\par Braces/DME Ordered: Naprosyn\par Activity/Work/Sports Status: None \par Additional Instructions: None\par Follow-Up: 6 weeks \par \par The patient's current medication management of their orthopedic diagnosis was reviewed today:\par (1) We discussed a comprehensive treatment plan that included possible pharmaceutical management involving the use of prescription strength medications including but not limited to options such as oral Naprosyn 500mg BID, once daily Meloxicam 15 mg, or 500-650 mg Tylenol versus over the counter oral medications and topical prescription NSAID Pennsaid vs over the counter Voltaren gel.  Based on our extensive discussion, the patient was prescribed Naprosyn 500mg BID for two weeks.  It will then be used PRN for pain, inflammation and discomfort.\par (2) There is a moderate risk of morbidity with further treatment, especially from use of prescription strength medications and possible side effects of these medications which include upset stomach with oral medications, skin reactions to topical medications and cardiac/renal issues with long term use.\par (3) I recommended that the patient follow-up with their medical physician to discuss any significant specific potential issues with long term medication use such as interactions with current medications or with exacerbation of underlying medical comorbidities.\par (4) The benefits and risks associated with use of injectable, oral or topical, prescription and over the counter anti-inflammatory medications were discussed with the patient. The patient voiced understanding of the risks including but not limited to bleeding, stroke, kidney dysfunction, heart disease, and were referred to the black box warning label for further information.\par \par \par I, Kimberlee Rodriguez, attest that this documentation has been prepared under the direction and in the presence of Provider Dr. Odell Rayo.\par \par The documentation recorded by the scribe accurately reflects the services I, Dr. Odell Rayo, personally performed and the decisions made by me.\par

## 2023-05-23 NOTE — HISTORY OF PRESENT ILLNESS
[de-identified] : The patient is a 67 year old right hand dominant female who presents today complaining of right shoulder pain.  \par Date of Injury/Onset: 9/7/22\par Pain:  At Rest: 4/10 \par With Activity:  4-10/10 \par Mechanism of injury: In [employer] and a box fell on patient and has had right shoulder pain since\par This is NOT a Work Related Injury being treated under Worker's Compensation.\par This is NOt an athletic injury occurring associated with an interscholastic or organized sports team.\par Quality of symptoms: pain in shoulder, stiffness in neck\par Improves with: NSAIDs, rest, PT\par Worse with: continuous circular motions, overuse\par Treatment/Imaging/Studies Since Last Visit: PT until 4/31, MRI\par 	Reports Available For Review Today: MRI report\par Out of work/sport: Still working\par 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 \par Additional Information: had right shoulder surgery 30 years ago with Dr. Penny\par \par

## 2023-06-19 ENCOUNTER — RX RENEWAL (OUTPATIENT)
Age: 68
End: 2023-06-19

## 2023-06-19 RX ORDER — NAPROXEN 500 MG/1
500 TABLET ORAL
Qty: 60 | Refills: 0 | Status: ACTIVE | COMMUNITY
Start: 2023-05-22 | End: 1900-01-01

## 2023-07-03 ENCOUNTER — APPOINTMENT (OUTPATIENT)
Dept: ORTHOPEDIC SURGERY | Facility: CLINIC | Age: 68
End: 2023-07-03
Payer: MEDICARE

## 2023-07-03 VITALS — HEIGHT: 63 IN | WEIGHT: 156 LBS | BODY MASS INDEX: 27.64 KG/M2

## 2023-07-03 DIAGNOSIS — M25.511 PAIN IN RIGHT SHOULDER: ICD-10-CM

## 2023-07-03 PROCEDURE — 99214 OFFICE O/P EST MOD 30 MIN: CPT

## 2023-07-03 NOTE — HISTORY OF PRESENT ILLNESS
[de-identified] : The patient is a 67 year old right hand dominant female who presents today complaining of right shoulder and neck pain.  \par Date of Injury/Onset: 9/7/22\par Pain:  At Rest: 4/10 \par With Activity:  4/10 \par Mechanism of injury: In [employer] and a box fell on patient and has had right shoulder pain since\par This is NOT a Work Related Injury being treated under Worker's Compensation.\par This is NOt an athletic injury occurring associated with an interscholastic or organized sports team.\par Quality of symptoms: pain in shoulder, stiffness in neck\par Improves with: NSAIDs, rest, PT\par Worse with: continuous circular motions, overuse\par Treatment/Imaging/Studies Since Last Visit: PT\par 	Reports Available For Review Today: none\par Out of work/sport: Still working\par 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 Penny\par \par

## 2023-07-03 NOTE — IMAGING
[de-identified] : The patient is a well appearing 68 year old female of their stated age. \par Neck is supple & nontender to palpation. Negative Spurling's test on the right. \par \par Effected Shoulder: RIGHT \par Inspection: \par Scapula Winging: Negative \par Deformity: None \par Erythema: None \par Ecchymosis: None \par Abrasions: None \par Effusion: None \par \par Range of Motion: \par Active Forward Flexion: 180 degrees  \par Active Abduction: 180 degrees  \par Passive Forward Flexion: 180 degrees  \par Passive Abduction: 180 degrees  \par ER @ 90 degrees: 90 degrees \par IR @ 90 degrees: 45 degrees \par ER @ 0 degrees: 50 degrees \par Motor Exam: \par Forward Flexion: 5 out of 5 \par Flexion Plane of Scapula: 5 out of 5 \par Abduction: 5 out of 5 \par Internal Rotation: 5 out of 5 \par External Rotation: 5 out of 5 \par Distal Motor Strength: 5 out of 5 \par \par Stability Testing: \par Anterior: 1+ \par Posterior: 1+ \par Sulcus N: 1+ \par Sulcus ER: 1+ \par Provocative Tests: \par Drop Arm: Negative \par Impingement: POSITIVE \par [place]: Negative \par X-Arm Adduction: Negative \par Belly Press: Negative \par Bear Hug: Negative \par Lift Off: Negative \par Apprehension: Negative \par Relocation: Negative \par Posterior Load & Shift: Negative \par \par Palpation: \par AC Joint: Nontender \par Clavicle: Nontender \par SC Joint: Nontender \par Bicepital Groove: Nontender \par Coracoid Process: Nontender \par Pectoralis Minor Tendon: Nontender \par Pectoralis Major Tendon: Nontender & palpably intact \par Latissimus Dorsi: Nontender  \par Proximal Humerus: Nontender \par Scapula Body: Nontender \par Medial Scapula Boarder: Nontender \par Scapula Spine: Nontender \par \par Neurologic Exam: Sensation to Light Touch: \par Axillary: Grossly intact \par Ulnar: Grossly intact \par Radial: Grossly intact \par Median: Grossly intact \par Other:  N/A \par Circulatory/Pulses: \par Ulnar: 2+ \par Radial: 2+ \par Other Pertinent Findings: None \par \par Contralateral Shoulder \par Range of Motion: \par Active Forward Flexion: 180 degrees  \par Active Abduction: 180 degrees  \par Passive Forward Flexion: 180 degrees  \par Passive Abduction: 180 degrees  \par ER @ 90 degrees: 90 degrees \par IR @ 90 degrees: 45 degrees \par ER @ 0 degrees: 50 degrees \par Motor Exam: \par Forward Flexion: 5 out of 5 \par Flexion Plane of Scapula: 5 out of 5 \par Abduction: 5 out of 5 \par Internal Rotation: 5 out of 5 \par External Rotation: 5 out of 5 \par Distal Motor Strength: 5 out of 5 \par Stability Testing: \par Anterior: 1+ \par Posterior: 1+ \par Sulcus N: 1+ \par Sulcus ER: 1+ \par Other Pertinent Findings: None \par \par Assessment: The patient is a 68 year old female with right shoulder pain and radiographic and physical exam findings consistent with resolving cervical radiculopathy, rotator cuff tear, and OA.\par The patient’s condition is acute \par Documents/Results Reviewed Today: None \par Tests/Studies Independently Interpreted Today: None \par Pertinent findings include: -Spurling right, +impingement, 5/5 strength throughout, \par Confounding medical conditions/concerns: None\par \par Plan: Discussed non-operative and operative treatment options for the patients rotator cuff tear considering OA. The patient continues to deny surgical intervention at this time. She reports gradual, interval improvement in cervical symptoms. Further discussed conservative treatments in the form of injections. Patient also declines to receive any injection at this time. Continue physical therapy, HEP, and stretching. Continue prn use of previously prescribed Naprosyn for pain management - use as directed. If the patient fails all conservative treatment modalities, she should consider proceeding with reverse shoulder arthroplasty. Modify activity as discussed. \par Tests Ordered: None \par Prescription Medications Ordered: Naproxen \par Braces/DME Ordered: None \par Activity/Work/Sports Status: None \par Additional Instructions: None\par Follow-Up: PRN \par \par The patient's current medication management of their orthopedic diagnosis was reviewed today:\par (1) We discussed a comprehensive treatment plan that included possible pharmaceutical management involving the use of prescription strength medications including but not limited to options such as oral Naprosyn 500mg BID, once daily Meloxicam 15 mg, or 500-650 mg Tylenol versus over the counter oral medications and topical prescription NSAID Pennsaid vs over the counter Voltaren gel.  Based on our extensive discussion, the patient declined prescription medication and will use over the counter Advil, Alleve, Voltaren Gel or Tylenol as directed.\par (2) There is a moderate risk of morbidity with further treatment, especially from use of prescription strength medications and possible side effects of these medications which include upset stomach with oral medications, skin reactions to topical medications and cardiac/renal issues with long term use.\par (3) I recommended that the patient follow-up with their medical physician to discuss any significant specific potential issues with long term medication use such as interactions with current medications or with exacerbation of underlying medical comorbidities.\par (4) The benefits and risks associated with use of injectable, oral or topical, prescription and over the counter anti-inflammatory medications were discussed with the patient. The patient voiced understanding of the risks including but not limited to bleeding, stroke, kidney dysfunction, heart disease, and were referred to the black box warning label for further information. \par \par Kavya PONCE attest that this documentation has been prepared under the direction and in the presence of Provider Dr. Odell Rayo. \par \par The documentation recorded by the scribe accurately reflects the services I, Dr. Odell Rayo, personally performed and the decisions made by me.\par

## 2023-07-03 NOTE — DATA REVIEWED
[MRI] : MRI [Right] : of the right [Shoulder] : shoulder [Report was reviewed and noted in the chart] : The report was reviewed and noted in the chart [I independently reviewed and interpreted images and report] : I independently reviewed and interpreted images and report [I reviewed the films/CD and additionally noted] : I reviewed the films/CD and additionally noted [FreeTextEntry1] :  advance GH OA, full thickness tear of the anterior supraspinatus tendon, partial thickness tear of the infraspinatus tendon, partial subscapularis tendon tear

## 2023-11-09 DIAGNOSIS — Z12.31 ENCOUNTER FOR SCREENING MAMMOGRAM FOR MALIGNANT NEOPLASM OF BREAST: ICD-10-CM

## 2023-11-20 ENCOUNTER — APPOINTMENT (OUTPATIENT)
Dept: MAMMOGRAPHY | Facility: CLINIC | Age: 68
End: 2023-11-20
Payer: MEDICARE

## 2023-11-20 ENCOUNTER — RESULT REVIEW (OUTPATIENT)
Age: 68
End: 2023-11-20

## 2023-11-20 ENCOUNTER — OUTPATIENT (OUTPATIENT)
Dept: OUTPATIENT SERVICES | Facility: HOSPITAL | Age: 68
LOS: 1 days | End: 2023-11-20
Payer: MEDICARE

## 2023-11-20 DIAGNOSIS — Z12.31 ENCOUNTER FOR SCREENING MAMMOGRAM FOR MALIGNANT NEOPLASM OF BREAST: ICD-10-CM

## 2023-11-20 PROCEDURE — 77063 BREAST TOMOSYNTHESIS BI: CPT | Mod: 26

## 2023-11-20 PROCEDURE — 77067 SCR MAMMO BI INCL CAD: CPT

## 2023-11-20 PROCEDURE — 77067 SCR MAMMO BI INCL CAD: CPT | Mod: 26

## 2023-11-20 PROCEDURE — 77063 BREAST TOMOSYNTHESIS BI: CPT

## 2023-12-20 ENCOUNTER — APPOINTMENT (OUTPATIENT)
Dept: OBGYN | Facility: CLINIC | Age: 68
End: 2023-12-20
Payer: MEDICARE

## 2023-12-20 VITALS
DIASTOLIC BLOOD PRESSURE: 68 MMHG | WEIGHT: 168 LBS | SYSTOLIC BLOOD PRESSURE: 102 MMHG | HEIGHT: 63 IN | BODY MASS INDEX: 29.77 KG/M2

## 2023-12-20 DIAGNOSIS — N95.2 POSTMENOPAUSAL ATROPHIC VAGINITIS: ICD-10-CM

## 2023-12-20 DIAGNOSIS — N81.10 CYSTOCELE, UNSPECIFIED: ICD-10-CM

## 2023-12-20 DIAGNOSIS — Z80.3 FAMILY HISTORY OF MALIGNANT NEOPLASM OF BREAST: ICD-10-CM

## 2023-12-20 PROCEDURE — 99214 OFFICE O/P EST MOD 30 MIN: CPT

## 2023-12-20 RX ORDER — ESTRADIOL 0.1 MG/G
0.1 CREAM VAGINAL
Qty: 1 | Refills: 3 | Status: ACTIVE | COMMUNITY
Start: 2023-12-20 | End: 1900-01-01

## 2023-12-20 NOTE — PHYSICAL EXAM
[Appropriately responsive] : appropriately responsive [Alert] : alert [No Acute Distress] : no acute distress [No Lymphadenopathy] : no lymphadenopathy [Regular Rate Rhythm] : regular rate rhythm [No Murmurs] : no murmurs [Clear to Auscultation B/L] : clear to auscultation bilaterally [Soft] : soft [Non-tender] : non-tender [Non-distended] : non-distended [No HSM] : No HSM [No Lesions] : no lesions [No Mass] : no mass [Oriented x3] : oriented x3 [FreeTextEntry6] : Breasts are soft nontender without any masses, there are no skin changes or nipple discharge, axilla negative there is no lymphadenopathy [Vulvar Atrophy] : vulvar atrophy [Labia Majora] : normal [Labia Minora] : normal [Atrophy] : atrophy [Cystocele] : a cystocele [Normal] : normal [Uterine Adnexae] : normal [No Tenderness] : no tenderness [Nl Sphincter Tone] : normal sphincter tone [FreeTextEntry4] : Second-degree cystocele noted [FreeTextEntry5] : Pap done [FreeTextEntry9] : Hemoccult negative

## 2023-12-20 NOTE — HISTORY OF PRESENT ILLNESS
[FreeTextEntry1] : Patient is a 68-year-old female who presents for a gynecologic evaluation.  Patient with complaints of a cystocele.  Patient states that it does not cause any major problems for her.  Patient advised to continue using estradiol vaginal cream which the patient had stopped using.  Patient's last mammogram was November 2023 and last bone density was November 2021 patient has a significant family history of mother with breast cancer

## 2023-12-20 NOTE — DISCUSSION/SUMMARY
[FreeTextEntry1] : Impression: Vulvovaginal atrophy, osteopenia, family history of breast cancer  Recommendations: Self breast exam, mammography annually, calcium and vitamin D supplementation regular exercise  Follow-up in 6 months due to family history

## 2023-12-22 LAB — CYTOLOGY CVX/VAG DOC THIN PREP: NORMAL

## 2024-01-12 NOTE — REASON FOR VISIT
Quality 226: Preventive Care And Screening: Tobacco Use: Screening And Cessation Intervention: Patient screened for tobacco use and is an ex/non-smoker
Detail Level: Detailed
[Follow-Up] : a follow-up evaluation of

## 2024-06-10 ENCOUNTER — APPOINTMENT (OUTPATIENT)
Dept: ORTHOPEDIC SURGERY | Facility: CLINIC | Age: 69
End: 2024-06-10
Payer: MEDICARE

## 2024-06-10 VITALS — WEIGHT: 168 LBS | HEIGHT: 63 IN | BODY MASS INDEX: 29.77 KG/M2

## 2024-06-10 DIAGNOSIS — M75.101 UNSPECIFIED ROTATOR CUFF TEAR OR RUPTURE OF RIGHT SHOULDER, NOT SPECIFIED AS TRAUMATIC: ICD-10-CM

## 2024-06-10 DIAGNOSIS — M19.011 PRIMARY OSTEOARTHRITIS, RIGHT SHOULDER: ICD-10-CM

## 2024-06-10 DIAGNOSIS — M54.12 RADICULOPATHY, CERVICAL REGION: ICD-10-CM

## 2024-06-10 PROCEDURE — 99213 OFFICE O/P EST LOW 20 MIN: CPT

## 2024-06-10 NOTE — IMAGING
[de-identified] : The patient is a well appearing 68 year old female of their stated age.  Neck is supple & nontender to palpation. Negative Spurling's test on the right.   Effected Shoulder: RIGHT  Inspection:  Scapula Winging: Negative  Deformity: None  Erythema: None  Ecchymosis: None  Abrasions: None  Effusion: None   Range of Motion:  Active Forward Flexion: 180 degrees   Active Abduction: 180 degrees   Passive Forward Flexion: 180 degrees   Passive Abduction: 180 degrees   ER @ 90 degrees: 90 degrees  IR @ 90 degrees: 45 degrees  ER @ 0 degrees: 50 degrees  Motor Exam:  Forward Flexion: 5 out of 5  Flexion Plane of Scapula: 5 out of 5  Abduction: 5 out of 5  Internal Rotation: 5 out of 5  External Rotation: 5 out of 5  Distal Motor Strength: 5 out of 5   Stability Testing:  Anterior: 1+  Posterior: 1+  Sulcus N: 1+  Sulcus ER: 1+  Provocative Tests:  Drop Arm: Negative  Impingement: POSITIVE  [place]: Negative  X-Arm Adduction: Negative  Belly Press: Negative  Bear Hug: Negative  Lift Off: Negative  Apprehension: Negative  Relocation: Negative  Posterior Load & Shift: Negative   Palpation:  AC Joint: Nontender  Clavicle: Nontender  SC Joint: Nontender  Bicepital Groove: Nontender  Coracoid Process: Nontender  Pectoralis Minor Tendon: Nontender  Pectoralis Major Tendon: Nontender & palpably intact  Latissimus Dorsi: Nontender   Proximal Humerus: Nontender  Scapula Body: Nontender  Medial Scapula Boarder: Nontender  Scapula Spine: Nontender   Neurologic Exam: Sensation to Light Touch:  Axillary: Grossly intact  Ulnar: Grossly intact  Radial: Grossly intact  Median: Grossly intact  Other:  N/A  Circulatory/Pulses:  Ulnar: 2+  Radial: 2+  Other Pertinent Findings: None   Contralateral Shoulder  Range of Motion:  Active Forward Flexion: 180 degrees   Active Abduction: 180 degrees   Passive Forward Flexion: 180 degrees   Passive Abduction: 180 degrees   ER @ 90 degrees: 90 degrees  IR @ 90 degrees: 45 degrees  ER @ 0 degrees: 50 degrees  Motor Exam:  Forward Flexion: 5 out of 5  Flexion Plane of Scapula: 5 out of 5  Abduction: 5 out of 5  Internal Rotation: 5 out of 5  External Rotation: 5 out of 5  Distal Motor Strength: 5 out of 5  Stability Testing:  Anterior: 1+  Posterior: 1+  Sulcus N: 1+  Sulcus ER: 1+  Other Pertinent Findings: None   Assessment: The patient is a 68 year old female with right shoulder pain and radiographic and physical exam findings consistent with resolving cervical radiculopathy, rotator cuff tear, and OA. The patient's condition is acute  Documents/Results Reviewed Today: None  Tests/Studies Independently Interpreted Today: None  Pertinent findings include: -Spurling right, +impingement, 5/5 strength throughout,  Confounding medical conditions/concerns: None  Plan: Again, we discussed treatment options non-operative and operative treatment options for the patients rotator cuff tear considering OA. The patient continues to deny surgical intervention at this time and elects to continue with formal physical therapy. Continue physical therapy, HEP, and stretching. Continue prn use of previously prescribed Naprosyn for pain management - use as directed. If the patient fails all conservative treatment modalities, she should consider proceeding with reverse shoulder arthroplasty. Modify activity as discussed.  Tests Ordered: None  Prescription Medications Ordered: Discussed appropriate use of OTC anti-inflammatories and analgesics (including but not limited to Aleve, Advil, Tylenol, Motrin, Ibuprofen, Voltaren gel, etc.)  Braces/DME Ordered: None  Activity/Work/Sports Status: None  Additional Instructions: None Follow-Up: PRN   The patient's current medication management of their orthopedic diagnosis was reviewed today: (1) We discussed a comprehensive treatment plan that included possible pharmaceutical management involving the use of prescription strength medications including but not limited to options such as oral Naprosyn 500mg BID, once daily Meloxicam 15 mg, or 500-650 mg Tylenol versus over the counter oral medications and topical prescription NSAID Pennsaid vs over the counter Voltaren gel.  Based on our extensive discussion, the patient declined prescription medication and will use over the counter Advil, Alleve, Voltaren Gel or Tylenol as directed. (2) There is a moderate risk of morbidity with further treatment, especially from use of prescription strength medications and possible side effects of these medications which include upset stomach with oral medications, skin reactions to topical medications and cardiac/renal issues with long term use. (3) I recommended that the patient follow-up with their medical physician to discuss any significant specific potential issues with long term medication use such as interactions with current medications or with exacerbation of underlying medical comorbidities. (4) The benefits and risks associated with use of injectable, oral or topical, prescription and over the counter anti-inflammatory medications were discussed with the patient. The patient voiced understanding of the risks including but not limited to bleeding, stroke, kidney dysfunction, heart disease, and were referred to the black box warning label for further information.   IKavya attest that this documentation has been prepared under the direction and in the presence of Provider Dr. Odell Rayo.   The documentation recorded by the scribe accurately reflects the services IDr. Odell, personally performed and the decisions made by me.

## 2024-06-10 NOTE — HISTORY OF PRESENT ILLNESS
[de-identified] : The patient is a 67 year old right hand dominant female who presents today complaining of right shoulder and neck pain.   Date of Injury/Onset: 9/7/22 Pain:  At Rest: 0/10  With Activity:  6/10  Mechanism of injury: In [employer] and a box fell on patient and has had right shoulder pain since This is NOT a Work Related Injury being treated under Worker's Compensation. This is NOt an athletic injury occurring associated with an interscholastic or organized sports team. 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 Shoulder pain and neck stiffness increased around 03/2024 with no new SOURAV. Additional Information: had right shoulder surgery 30 years ago with Dr. Penny

## 2024-11-05 ENCOUNTER — OFFICE (OUTPATIENT)
Dept: URBAN - METROPOLITAN AREA CLINIC 102 | Facility: CLINIC | Age: 69
Setting detail: OPHTHALMOLOGY
End: 2024-11-05
Payer: MEDICARE

## 2024-11-05 DIAGNOSIS — H53.9: ICD-10-CM

## 2024-11-05 DIAGNOSIS — H43.811: ICD-10-CM

## 2024-11-05 DIAGNOSIS — D31.30: ICD-10-CM

## 2024-11-05 DIAGNOSIS — H43.22: ICD-10-CM

## 2024-11-05 DIAGNOSIS — H35.373: ICD-10-CM

## 2024-11-05 DIAGNOSIS — H25.13: ICD-10-CM

## 2024-11-05 DIAGNOSIS — H43.392: ICD-10-CM

## 2024-11-05 PROCEDURE — 92083 EXTENDED VISUAL FIELD XM: CPT | Performed by: OPHTHALMOLOGY

## 2024-11-05 PROCEDURE — 99213 OFFICE O/P EST LOW 20 MIN: CPT | Performed by: OPHTHALMOLOGY

## 2024-11-05 PROCEDURE — 92134 CPTRZ OPH DX IMG PST SGM RTA: CPT | Performed by: OPHTHALMOLOGY

## 2024-11-05 ASSESSMENT — REFRACTION_CURRENTRX
OS_OVR_VA: 20/
OS_AXIS: 080
OS_ADD: +2.50
OD_OVR_VA: 20/
OS_CYLINDER: -1.50
OD_ADD: +2.50
OD_CYLINDER: -1.00
OD_VPRISM_DIRECTION: PROGS
OD_SPHERE: +2.50
OS_SPHERE: +3.00
OS_VPRISM_DIRECTION: PROGS
OD_AXIS: 089

## 2024-11-05 ASSESSMENT — VISUAL ACUITY
OD_BCVA: 20/20
OS_BCVA: 20/20-1

## 2024-11-05 ASSESSMENT — KERATOMETRY
OD_K2POWER_DIOPTERS: 45.75
OS_AXISANGLE_DEGREES: 161
OD_AXISANGLE_DEGREES: 014
OS_K1POWER_DIOPTERS: 42.00
METHOD_AUTO_MANUAL: AUTO
OD_K1POWER_DIOPTERS: 45.00
OS_K2POWER_DIOPTERS: 42.50

## 2024-11-05 ASSESSMENT — REFRACTION_AUTOREFRACTION
OD_AXIS: 093
OD_SPHERE: +3.00
OS_AXIS: 093
OS_SPHERE: +3.25
OD_CYLINDER: -1.00
OS_CYLINDER: +1.25

## 2024-11-05 ASSESSMENT — TONOMETRY
OD_IOP_MMHG: 14
OS_IOP_MMHG: 13

## 2024-11-05 ASSESSMENT — REFRACTION_MANIFEST
OU_VA: 20/20
OS_VA1: 20/20
OD_SPHERE: +2.50
OS_ADD: +2.50
OS_SPHERE: +2.50
OD_AXIS: 090
OS_AXIS: 090
OD_VA1: 20/20
OD_ADD: +2.50
OS_CYLINDER: -1.25
OD_CYLINDER: -0.75

## 2024-11-05 ASSESSMENT — CONFRONTATIONAL VISUAL FIELD TEST (CVF)
OD_FINDINGS: FULL
OS_FINDINGS: FULL

## 2024-11-18 ENCOUNTER — OFFICE (OUTPATIENT)
Dept: URBAN - METROPOLITAN AREA CLINIC 102 | Facility: CLINIC | Age: 69
Setting detail: OPHTHALMOLOGY
End: 2024-11-18
Payer: MEDICARE

## 2024-11-18 DIAGNOSIS — H43.811: ICD-10-CM

## 2024-11-18 DIAGNOSIS — D31.30: ICD-10-CM

## 2024-11-18 PROBLEM — H43.392 VITREOUS FLOATERS;  , LEFT EYE: Status: ACTIVE | Noted: 2024-11-05

## 2024-11-18 PROCEDURE — 99213 OFFICE O/P EST LOW 20 MIN: CPT | Performed by: OPHTHALMOLOGY

## 2024-11-18 ASSESSMENT — REFRACTION_CURRENTRX
OS_SPHERE: +3.00
OS_AXIS: 080
OS_VPRISM_DIRECTION: PROGS
OD_CYLINDER: -1.00
OD_OVR_VA: 20/
OD_SPHERE: +2.50
OS_OVR_VA: 20/
OD_ADD: +2.50
OD_VPRISM_DIRECTION: PROGS
OS_ADD: +2.50
OS_CYLINDER: -1.50
OD_AXIS: 089

## 2024-11-18 ASSESSMENT — VISUAL ACUITY
OS_BCVA: 20/20
OD_BCVA: 20/20

## 2024-11-18 ASSESSMENT — REFRACTION_AUTOREFRACTION
OS_CYLINDER: -1.25
OS_SPHERE: +3.25
OS_AXIS: 096
OD_SPHERE: +2.75
OD_AXIS: 093
OD_CYLINDER: -1.00

## 2024-11-18 ASSESSMENT — REFRACTION_MANIFEST
OS_AXIS: 090
OS_CYLINDER: -1.25
OS_VA1: 20/20
OS_ADD: +2.50
OS_SPHERE: +2.50
OD_AXIS: 090
OD_SPHERE: +2.50
OU_VA: 20/20
OD_CYLINDER: -0.75
OD_VA1: 20/20
OD_ADD: +2.50

## 2024-11-18 ASSESSMENT — KERATOMETRY
OD_K2POWER_DIOPTERS: 42.50
OS_K2POWER_DIOPTERS: 42.50
METHOD_AUTO_MANUAL: AUTO
OS_AXISANGLE_DEGREES: 159
OS_K1POWER_DIOPTERS: 41.75
OD_AXISANGLE_DEGREES: 035
OD_K1POWER_DIOPTERS: 41.75

## 2024-11-18 ASSESSMENT — CONFRONTATIONAL VISUAL FIELD TEST (CVF)
OD_FINDINGS: FULL
OS_FINDINGS: FULL

## 2024-11-18 ASSESSMENT — TONOMETRY
OS_IOP_MMHG: 11
OD_IOP_MMHG: 13

## 2024-12-11 ENCOUNTER — RESULT REVIEW (OUTPATIENT)
Age: 69
End: 2024-12-11

## 2024-12-11 DIAGNOSIS — Z12.39 ENCOUNTER FOR OTHER SCREENING FOR MALIGNANT NEOPLASM OF BREAST: ICD-10-CM

## 2024-12-11 DIAGNOSIS — Z80.3 FAMILY HISTORY OF MALIGNANT NEOPLASM OF BREAST: ICD-10-CM

## 2024-12-13 ENCOUNTER — RESULT REVIEW (OUTPATIENT)
Age: 69
End: 2024-12-13

## 2024-12-13 ENCOUNTER — APPOINTMENT (OUTPATIENT)
Dept: ULTRASOUND IMAGING | Facility: CLINIC | Age: 69
End: 2024-12-13
Payer: MEDICARE

## 2024-12-13 ENCOUNTER — APPOINTMENT (OUTPATIENT)
Dept: MAMMOGRAPHY | Facility: CLINIC | Age: 69
End: 2024-12-13
Payer: MEDICARE

## 2024-12-13 ENCOUNTER — OUTPATIENT (OUTPATIENT)
Dept: OUTPATIENT SERVICES | Facility: HOSPITAL | Age: 69
LOS: 1 days | End: 2024-12-13
Payer: MEDICARE

## 2024-12-13 DIAGNOSIS — Z12.39 ENCOUNTER FOR OTHER SCREENING FOR MALIGNANT NEOPLASM OF BREAST: ICD-10-CM

## 2024-12-13 PROCEDURE — 77066 DX MAMMO INCL CAD BI: CPT

## 2024-12-13 PROCEDURE — G0279: CPT

## 2024-12-13 PROCEDURE — 76641 ULTRASOUND BREAST COMPLETE: CPT | Mod: 26,50,GA

## 2024-12-13 PROCEDURE — G0279: CPT | Mod: 26

## 2024-12-13 PROCEDURE — 77066 DX MAMMO INCL CAD BI: CPT | Mod: 26

## 2024-12-13 PROCEDURE — 76641 ULTRASOUND BREAST COMPLETE: CPT

## 2025-01-08 ENCOUNTER — APPOINTMENT (OUTPATIENT)
Dept: OBGYN | Facility: CLINIC | Age: 70
End: 2025-01-08
Payer: MEDICARE

## 2025-01-08 VITALS
HEIGHT: 63 IN | DIASTOLIC BLOOD PRESSURE: 62 MMHG | SYSTOLIC BLOOD PRESSURE: 110 MMHG | BODY MASS INDEX: 26.4 KG/M2 | WEIGHT: 149 LBS

## 2025-01-08 DIAGNOSIS — N95.2 POSTMENOPAUSAL ATROPHIC VAGINITIS: ICD-10-CM

## 2025-01-08 DIAGNOSIS — N81.10 CYSTOCELE, UNSPECIFIED: ICD-10-CM

## 2025-01-08 DIAGNOSIS — M85.80 OTHER SPECIFIED DISORDERS OF BONE DENSITY AND STRUCTURE, UNSPECIFIED SITE: ICD-10-CM

## 2025-01-08 LAB
DATE COLLECTED: NORMAL
HEMOCCULT SP1 STL QL: NEGATIVE
QUALITY CONTROL: YES

## 2025-01-08 PROCEDURE — 99214 OFFICE O/P EST MOD 30 MIN: CPT

## 2025-01-08 PROCEDURE — 82270 OCCULT BLOOD FECES: CPT

## 2025-01-08 PROCEDURE — G2211 COMPLEX E/M VISIT ADD ON: CPT

## 2025-01-09 PROBLEM — M85.80 OSTEOPENIA, UNSPECIFIED LOCATION: Status: ACTIVE | Noted: 2025-01-09

## 2025-01-09 PROBLEM — N81.10 FEMALE CYSTOCELE: Status: RESOLVED | Noted: 2023-12-20 | Resolved: 2025-01-09

## 2025-01-09 PROBLEM — N95.2 VAGINAL ATROPHY: Status: RESOLVED | Noted: 2023-12-20 | Resolved: 2025-01-09

## 2025-01-09 PROBLEM — N81.10 FEMALE CYSTOCELE: Status: ACTIVE | Noted: 2025-01-09

## 2025-01-09 PROBLEM — N95.2 VAGINAL ATROPHY: Status: ACTIVE | Noted: 2025-01-09

## 2025-01-09 RX ORDER — ESTRADIOL 0.1 MG/G
0.1 CREAM VAGINAL
Qty: 1 | Refills: 3 | Status: ACTIVE | COMMUNITY
Start: 2025-01-09 | End: 1900-01-01

## 2025-01-10 LAB — HPV HIGH+LOW RISK DNA PNL CVX: NOT DETECTED

## 2025-01-12 LAB — CYTOLOGY CVX/VAG DOC THIN PREP: NORMAL

## 2025-01-14 ENCOUNTER — OFFICE (OUTPATIENT)
Dept: URBAN - METROPOLITAN AREA CLINIC 102 | Facility: CLINIC | Age: 70
Setting detail: OPHTHALMOLOGY
End: 2025-01-14
Payer: MEDICARE

## 2025-01-14 DIAGNOSIS — H43.392: ICD-10-CM

## 2025-01-14 DIAGNOSIS — H43.811: ICD-10-CM

## 2025-01-14 DIAGNOSIS — D31.31: ICD-10-CM

## 2025-01-14 PROCEDURE — 92014 COMPRE OPH EXAM EST PT 1/>: CPT | Performed by: OPHTHALMOLOGY

## 2025-01-14 ASSESSMENT — CONFRONTATIONAL VISUAL FIELD TEST (CVF)
OD_FINDINGS: FULL
OS_FINDINGS: FULL

## 2025-01-14 ASSESSMENT — REFRACTION_MANIFEST
OD_CYLINDER: -0.75
OS_CYLINDER: -1.25
OD_VA1: 20/20
OS_ADD: +2.50
OD_AXIS: 090
OS_AXIS: 090
OD_SPHERE: +2.50
OS_VA1: 20/20
OU_VA: 20/20
OD_ADD: +2.50
OS_SPHERE: +2.50

## 2025-01-14 ASSESSMENT — KERATOMETRY
OS_K1POWER_DIOPTERS: 41.75
OS_K2POWER_DIOPTERS: 42.25
OD_K1POWER_DIOPTERS: 42.00
METHOD_AUTO_MANUAL: AUTO
OD_AXISANGLE_DEGREES: 040
OS_AXISANGLE_DEGREES: 165
OD_K2POWER_DIOPTERS: 42.50

## 2025-01-14 ASSESSMENT — REFRACTION_AUTOREFRACTION
OS_CYLINDER: -1.50
OD_SPHERE: +2.75
OD_CYLINDER: -1.00
OS_AXIS: 089
OS_SPHERE: +3.25
OD_AXIS: 092

## 2025-01-14 ASSESSMENT — REFRACTION_CURRENTRX
OD_ADD: +2.50
OS_SPHERE: +3.00
OS_OVR_VA: 20/
OD_SPHERE: +2.50
OS_VPRISM_DIRECTION: PROGS
OD_AXIS: 089
OD_OVR_VA: 20/
OS_ADD: +2.50
OD_CYLINDER: -1.00
OS_AXIS: 080
OS_CYLINDER: -1.50
OD_VPRISM_DIRECTION: PROGS

## 2025-01-14 ASSESSMENT — VISUAL ACUITY
OD_BCVA: 20/20
OS_BCVA: 20/20

## 2025-01-14 ASSESSMENT — TONOMETRY
OD_IOP_MMHG: 10
OS_IOP_MMHG: 10

## 2025-06-25 ENCOUNTER — APPOINTMENT (OUTPATIENT)
Dept: OBGYN | Facility: CLINIC | Age: 70
End: 2025-06-25
Payer: MEDICARE

## 2025-06-25 VITALS
HEIGHT: 63 IN | WEIGHT: 144 LBS | DIASTOLIC BLOOD PRESSURE: 64 MMHG | BODY MASS INDEX: 25.52 KG/M2 | SYSTOLIC BLOOD PRESSURE: 114 MMHG

## 2025-06-25 PROCEDURE — G2211 COMPLEX E/M VISIT ADD ON: CPT

## 2025-06-25 PROCEDURE — 99213 OFFICE O/P EST LOW 20 MIN: CPT

## 2025-06-25 RX ORDER — ESTRADIOL 0.1 MG/G
0.1 CREAM VAGINAL
Qty: 1 | Refills: 3 | Status: ACTIVE | COMMUNITY
Start: 2025-06-25 | End: 1900-01-01

## 2025-07-15 ENCOUNTER — OFFICE (OUTPATIENT)
Dept: URBAN - METROPOLITAN AREA CLINIC 102 | Facility: CLINIC | Age: 70
Setting detail: OPHTHALMOLOGY
End: 2025-07-15
Payer: MEDICARE

## 2025-07-15 DIAGNOSIS — H43.392: ICD-10-CM

## 2025-07-15 DIAGNOSIS — D31.31: ICD-10-CM

## 2025-07-15 DIAGNOSIS — H25.13: ICD-10-CM

## 2025-07-15 DIAGNOSIS — H43.22: ICD-10-CM

## 2025-07-15 DIAGNOSIS — H43.811: ICD-10-CM

## 2025-07-15 DIAGNOSIS — H35.373: ICD-10-CM

## 2025-07-15 PROCEDURE — 92134 CPTRZ OPH DX IMG PST SGM RTA: CPT | Performed by: OPHTHALMOLOGY

## 2025-07-15 PROCEDURE — 92014 COMPRE OPH EXAM EST PT 1/>: CPT | Performed by: OPHTHALMOLOGY

## 2025-07-15 ASSESSMENT — REFRACTION_MANIFEST
OS_VA1: 20/20
OD_ADD: +2.50
OS_ADD: +2.50
OD_VA1: 20/20
OD_AXIS: 090
OS_SPHERE: +2.50
OD_SPHERE: +2.50
OS_AXIS: 090
OU_VA: 20/20
OS_CYLINDER: -1.25
OD_CYLINDER: -0.75

## 2025-07-15 ASSESSMENT — CONFRONTATIONAL VISUAL FIELD TEST (CVF)
OS_FINDINGS: FULL
OD_FINDINGS: FULL

## 2025-07-15 ASSESSMENT — REFRACTION_CURRENTRX
OS_CYLINDER: -1.50
OS_SPHERE: +3.00
OD_AXIS: 089
OS_AXIS: 080
OD_SPHERE: +2.50
OD_OVR_VA: 20/
OS_VPRISM_DIRECTION: PROGS
OD_VPRISM_DIRECTION: PROGS
OD_ADD: +2.50
OD_CYLINDER: -1.00
OS_ADD: +2.50
OS_OVR_VA: 20/

## 2025-07-15 ASSESSMENT — REFRACTION_AUTOREFRACTION
OD_SPHERE: +2.75
OS_SPHERE: +3.00
OD_AXIS: 091
OS_AXIS: 094
OD_CYLINDER: -1.00
OS_CYLINDER: -1.25

## 2025-07-15 ASSESSMENT — KERATOMETRY
METHOD_AUTO_MANUAL: AUTO
OD_K1POWER_DIOPTERS: 42.00
OD_AXISANGLE_DEGREES: 037
OS_K1POWER_DIOPTERS: 42.25
OS_K2POWER_DIOPTERS: 42.50
OS_AXISANGLE_DEGREES: 160
OD_K2POWER_DIOPTERS: 42.75

## 2025-07-15 ASSESSMENT — VISUAL ACUITY
OD_BCVA: 20/20-1
OS_BCVA: 20/20-2

## 2025-07-15 ASSESSMENT — TONOMETRY
OS_IOP_MMHG: 13
OD_IOP_MMHG: 14